# Patient Record
Sex: FEMALE | Race: WHITE | NOT HISPANIC OR LATINO | Employment: FULL TIME | URBAN - METROPOLITAN AREA
[De-identification: names, ages, dates, MRNs, and addresses within clinical notes are randomized per-mention and may not be internally consistent; named-entity substitution may affect disease eponyms.]

---

## 2023-04-14 LAB
HEPATITIS B SURFACE ANTIGEN (EXTERNAL): NEGATIVE
HIV1+2 AB SERPL QL IA: NONREACTIVE
RUBELLA ANTIBODY IGG (EXTERNAL): NORMAL
TREPONEMA PALLIDUM ANTIBODY (EXTERNAL): NONREACTIVE

## 2023-05-05 LAB — CHLAMYDIA - HIM PATIENT REPORTED: NEGATIVE

## 2023-06-02 ENCOUNTER — TRANSFERRED RECORDS (OUTPATIENT)
Dept: HEALTH INFORMATION MANAGEMENT | Facility: CLINIC | Age: 33
End: 2023-06-02

## 2023-07-24 ENCOUNTER — TRANSFERRED RECORDS (OUTPATIENT)
Dept: HEALTH INFORMATION MANAGEMENT | Facility: CLINIC | Age: 33
End: 2023-07-24
Payer: COMMERCIAL

## 2023-07-24 ENCOUNTER — MEDICAL CORRESPONDENCE (OUTPATIENT)
Dept: HEALTH INFORMATION MANAGEMENT | Facility: CLINIC | Age: 33
End: 2023-07-24
Payer: COMMERCIAL

## 2023-08-15 ENCOUNTER — OFFICE VISIT (OUTPATIENT)
Dept: CARDIOLOGY | Facility: CLINIC | Age: 33
End: 2023-08-15
Payer: COMMERCIAL

## 2023-08-15 ENCOUNTER — HOSPITAL ENCOUNTER (OUTPATIENT)
Dept: CARDIOLOGY | Facility: CLINIC | Age: 33
Discharge: HOME | End: 2023-08-15
Admitting: PHYSICIAN ASSISTANT
Payer: COMMERCIAL

## 2023-08-15 DIAGNOSIS — Z78.9 CONCEIVED BY IN VITRO FERTILIZATION: ICD-10-CM

## 2023-08-15 DIAGNOSIS — E66.01 OBESITY, CLASS III, BMI 40-49.9 (MORBID OBESITY) (H): ICD-10-CM

## 2023-08-15 DIAGNOSIS — O09.812 PREGNANCY RESULTING FROM IN VITRO FERTILIZATION IN SECOND TRIMESTER: Primary | ICD-10-CM

## 2023-08-15 PROCEDURE — 99204 OFFICE O/P NEW MOD 45 MIN: CPT | Mod: 25 | Performed by: PEDIATRICS

## 2023-08-15 PROCEDURE — 93325 DOPPLER ECHO COLOR FLOW MAPG: CPT | Mod: 26 | Performed by: PEDIATRICS

## 2023-08-15 PROCEDURE — 93325 DOPPLER ECHO COLOR FLOW MAPG: CPT

## 2023-08-15 PROCEDURE — 76825 ECHO EXAM OF FETAL HEART: CPT | Mod: 26 | Performed by: PEDIATRICS

## 2023-08-15 PROCEDURE — 76825 ECHO EXAM OF FETAL HEART: CPT

## 2023-08-15 PROCEDURE — 76827 ECHO EXAM OF FETAL HEART: CPT | Mod: 26 | Performed by: PEDIATRICS

## 2023-08-15 NOTE — PROGRESS NOTES
Fetal Cardiology Consultation    Patient:  Melissa L Scheierl MRN:  [id number]   YOB: 1990 Age:  33 year old   Date of Visit:  8/15/2023 PCP:  Katherin Samayoa PA-C   MIRNA: 12/13/23 EGA: 22+6 weeks     Dear Doctor:    I had the pleasure of seeing Melissa L Scheierl at the [facility] in [place] on 8/15/2023 in consultation for fetal echocardiography results. As you know, she is a 33 year old female with current pregnancy achieved through IVF.    The fetal echocardiogram was normal. Normal fetal cardiac anatomy. Normal right and left ventricular size and function without hypertrophy. No evidence of diastolic dysfunction. No pericardial effusion. No arrhythmia.     I reviewed and interpreted the fetal echocardiogram today. I discussed the normal results with Ms. Scheierl. While these results are normal, it is important to note that fetal echocardiography cannot exclude small atrial or ventricular septal defects, persistent ductus arteriosus, mild coarctation of the aorta, partial anomalous pulmonary venous return, minor anatomic valve anomalies, or coronary artery anomalies.     Thank you for allowing me to participate in Ms. Scheierl's care. Please don't hesitate to contact me or the Fetal Cardiology team at [facility] with any questions or concerns.     I spent a total of 40 minutes on the date of the encounter doing chart review, patient history, documentation, counseling, and coordinating care.    Rinku Maria MD  Pediatric Cardiology  [facility]  Phone [phone]    Review of the result(s) of each unique test - fetal echocardiogram

## 2023-11-14 ENCOUNTER — HOSPITAL ENCOUNTER (OUTPATIENT)
Facility: CLINIC | Age: 33
Discharge: HOME | End: 2023-11-14
Attending: OBSTETRICS & GYNECOLOGY | Admitting: OBSTETRICS & GYNECOLOGY
Payer: COMMERCIAL

## 2023-11-14 VITALS
SYSTOLIC BLOOD PRESSURE: 111 MMHG | BODY MASS INDEX: 41.95 KG/M2 | HEART RATE: 105 BPM | WEIGHT: 293 LBS | DIASTOLIC BLOOD PRESSURE: 59 MMHG | RESPIRATION RATE: 16 BRPM | HEIGHT: 70 IN | TEMPERATURE: 97.9 F

## 2023-11-14 PROBLEM — Z36.89 ENCOUNTER FOR TRIAGE IN PREGNANT PATIENT: Status: ACTIVE | Noted: 2023-11-14

## 2023-11-14 PROCEDURE — G0463 HOSPITAL OUTPT CLINIC VISIT: HCPCS | Mod: 25

## 2023-11-14 RX ORDER — PROCHLORPERAZINE 25 MG
25 SUPPOSITORY, RECTAL RECTAL EVERY 12 HOURS PRN
Status: DISCONTINUED | OUTPATIENT
Start: 2023-11-14 | End: 2023-11-14 | Stop reason: HOSPADM

## 2023-11-14 RX ORDER — VITAMIN A ACETATE, .BETA.-CAROTENE, ASCORBIC ACID, CHOLECALCIFEROL, .ALPHA.-TOCOPHEROL ACETATE, DL-, THIAMINE MONONITRATE, RIBOFLAVIN, NIACINAMIDE, PYRIDOXINE HYDROCHLORIDE, FOLIC ACID, CYANOCOBALAMIN, CALCIUM CARBONATE, FERROUS FUMARATE, ZINC OXIDE, AND CUPRIC OXIDE 2000; 2000; 120; 400; 22; 1.84; 3; 20; 10; 1; 12; 200; 27; 25; 2 [IU]/1; [IU]/1; MG/1; [IU]/1; MG/1; MG/1; MG/1; MG/1; MG/1; MG/1; UG/1; MG/1; MG/1; MG/1; MG/1
1 TABLET ORAL DAILY
COMMUNITY

## 2023-11-14 RX ORDER — LABETALOL 200 MG/1
200 TABLET, FILM COATED ORAL 2 TIMES DAILY
Status: ON HOLD | COMMUNITY
End: 2023-11-25

## 2023-11-14 RX ORDER — METOCLOPRAMIDE HYDROCHLORIDE 5 MG/ML
10 INJECTION INTRAMUSCULAR; INTRAVENOUS EVERY 6 HOURS PRN
Status: DISCONTINUED | OUTPATIENT
Start: 2023-11-14 | End: 2023-11-14 | Stop reason: HOSPADM

## 2023-11-14 RX ORDER — METOCLOPRAMIDE 10 MG/1
10 TABLET ORAL EVERY 6 HOURS PRN
Status: DISCONTINUED | OUTPATIENT
Start: 2023-11-14 | End: 2023-11-14 | Stop reason: HOSPADM

## 2023-11-14 RX ORDER — PROCHLORPERAZINE MALEATE 5 MG
10 TABLET ORAL EVERY 6 HOURS PRN
Status: DISCONTINUED | OUTPATIENT
Start: 2023-11-14 | End: 2023-11-14 | Stop reason: HOSPADM

## 2023-11-14 RX ORDER — ONDANSETRON 4 MG/1
4 TABLET, ORALLY DISINTEGRATING ORAL EVERY 6 HOURS PRN
Status: DISCONTINUED | OUTPATIENT
Start: 2023-11-14 | End: 2023-11-14 | Stop reason: HOSPADM

## 2023-11-14 RX ORDER — ASPIRIN 81 MG/1
81 TABLET, CHEWABLE ORAL DAILY
COMMUNITY

## 2023-11-14 RX ORDER — POLYETHYLENE GLYCOL 3350 17 G/17G
1 POWDER, FOR SOLUTION ORAL DAILY
COMMUNITY

## 2023-11-14 RX ORDER — ONDANSETRON 2 MG/ML
4 INJECTION INTRAMUSCULAR; INTRAVENOUS EVERY 6 HOURS PRN
Status: DISCONTINUED | OUTPATIENT
Start: 2023-11-14 | End: 2023-11-14 | Stop reason: HOSPADM

## 2023-11-14 ASSESSMENT — ACTIVITIES OF DAILY LIVING (ADL): ADLS_ACUITY_SCORE: 18

## 2023-11-15 ENCOUNTER — TRANSFERRED RECORDS (OUTPATIENT)
Dept: HEALTH INFORMATION MANAGEMENT | Facility: CLINIC | Age: 33
End: 2023-11-15
Payer: COMMERCIAL

## 2023-11-15 LAB — GROUP B STREPTOCOCCUS (EXTERNAL): NEGATIVE

## 2023-11-15 NOTE — PROVIDER NOTIFICATION
11/14/23 2122   Provider Notification   Provider Name/Title Dr. Chikis amaya   Method of Notification Phone   Request Evaluate - Remote   Notification Reason Patient Arrived     Spoke with Dr. Chiksi Amaya and updated her on but not limited to fetal heart rate, blood pressure, patient status. Orders obtained to discharge home and follow up in clinic tomorrow. TORB. Patient agrees with plan of care.

## 2023-11-15 NOTE — PROGRESS NOTES
Data: Patient assessed in the [facility] for decreased fetal movement and hypertension disorder of pregnancy. Cervical exam deferred. Membranes intact. Contractions are not present. See flowsheets for fetal assessment documentation.     Action: Presumed adequate fetal oxygenation documented. Discharge instructions reviewed. Patient instructed to report change in fetal movement, vaginal leaking of fluid or bleeding, abdominal pain, or any concerns related to the pregnancy to provider/clinic.      Response: Orders to discharge home per Dr. Chikis Coon. Patient verbalized understanding of education and agreement with plan. Discharged to home at 2136.

## 2023-11-15 NOTE — PROGRESS NOTES
Discharge instructions given to patient. Denies concerns or questions at this time. Signs and symptoms of preeclampsia and labor reviewed with patient. Patient and significant other ambulated off unit together.

## 2023-11-20 LAB
ABO/RH(D): NORMAL
ANTIBODY SCREEN: NEGATIVE
SPECIMEN EXPIRATION DATE: NORMAL

## 2023-11-21 ENCOUNTER — LAB (OUTPATIENT)
Dept: LAB | Facility: CLINIC | Age: 33
End: 2023-11-21
Payer: COMMERCIAL

## 2023-11-21 DIAGNOSIS — Z01.818 PREOPERATIVE EXAMINATION: ICD-10-CM

## 2023-11-21 LAB
ERYTHROCYTE [DISTWIDTH] IN BLOOD BY AUTOMATED COUNT: 13.8 % (ref 10–15)
HCT VFR BLD AUTO: 41.5 % (ref 35–47)
HGB BLD-MCNC: 13.5 G/DL (ref 11.7–15.7)
MCH RBC QN AUTO: 28.7 PG (ref 26.5–33)
MCHC RBC AUTO-ENTMCNC: 32.5 G/DL (ref 31.5–36.5)
MCV RBC AUTO: 88 FL (ref 78–100)
PLATELET # BLD AUTO: 220 10E3/UL (ref 150–450)
RBC # BLD AUTO: 4.7 10E6/UL (ref 3.8–5.2)
WBC # BLD AUTO: 11.6 10E3/UL (ref 4–11)

## 2023-11-21 PROCEDURE — 86780 TREPONEMA PALLIDUM: CPT

## 2023-11-21 PROCEDURE — 85027 COMPLETE CBC AUTOMATED: CPT

## 2023-11-21 PROCEDURE — 36415 COLL VENOUS BLD VENIPUNCTURE: CPT

## 2023-11-21 PROCEDURE — 86900 BLOOD TYPING SEROLOGIC ABO: CPT

## 2023-11-22 ENCOUNTER — HOSPITAL ENCOUNTER (INPATIENT)
Facility: CLINIC | Age: 33
LOS: 3 days | Discharge: HOME HEALTH | End: 2023-11-25
Attending: STUDENT IN AN ORGANIZED HEALTH CARE EDUCATION/TRAINING PROGRAM | Admitting: STUDENT IN AN ORGANIZED HEALTH CARE EDUCATION/TRAINING PROGRAM
Payer: COMMERCIAL

## 2023-11-22 ENCOUNTER — ANESTHESIA (OUTPATIENT)
Dept: OBGYN | Facility: CLINIC | Age: 33
End: 2023-11-22
Payer: COMMERCIAL

## 2023-11-22 ENCOUNTER — ANESTHESIA EVENT (OUTPATIENT)
Dept: OBGYN | Facility: CLINIC | Age: 33
End: 2023-11-22
Payer: COMMERCIAL

## 2023-11-22 DIAGNOSIS — O10.919 CHRONIC HYPERTENSION AFFECTING PREGNANCY: ICD-10-CM

## 2023-11-22 DIAGNOSIS — R21 RASH: ICD-10-CM

## 2023-11-22 DIAGNOSIS — Z98.891 S/P CESAREAN SECTION: Primary | ICD-10-CM

## 2023-11-22 LAB — T PALLIDUM AB SER QL: NONREACTIVE

## 2023-11-22 PROCEDURE — 370N000017 HC ANESTHESIA TECHNICAL FEE, PER MIN: Performed by: STUDENT IN AN ORGANIZED HEALTH CARE EDUCATION/TRAINING PROGRAM

## 2023-11-22 PROCEDURE — 88307 TISSUE EXAM BY PATHOLOGIST: CPT | Mod: TC | Performed by: STUDENT IN AN ORGANIZED HEALTH CARE EDUCATION/TRAINING PROGRAM

## 2023-11-22 PROCEDURE — 360N000076 HC SURGERY LEVEL 3, PER MIN: Performed by: STUDENT IN AN ORGANIZED HEALTH CARE EDUCATION/TRAINING PROGRAM

## 2023-11-22 PROCEDURE — 250N000025 HC SEVOFLURANE, PER MIN: Performed by: STUDENT IN AN ORGANIZED HEALTH CARE EDUCATION/TRAINING PROGRAM

## 2023-11-22 PROCEDURE — 710N000011 HC RECOVERY PHASE 1, LEVEL 3, PER MIN: Performed by: STUDENT IN AN ORGANIZED HEALTH CARE EDUCATION/TRAINING PROGRAM

## 2023-11-22 PROCEDURE — 250N000011 HC RX IP 250 OP 636: Mod: JZ | Performed by: NURSE ANESTHETIST, CERTIFIED REGISTERED

## 2023-11-22 PROCEDURE — 250N000011 HC RX IP 250 OP 636: Performed by: STUDENT IN AN ORGANIZED HEALTH CARE EDUCATION/TRAINING PROGRAM

## 2023-11-22 PROCEDURE — 250N000013 HC RX MED GY IP 250 OP 250 PS 637

## 2023-11-22 PROCEDURE — 120N000012 HC R&B POSTPARTUM

## 2023-11-22 PROCEDURE — 250N000011 HC RX IP 250 OP 636: Mod: JZ | Performed by: STUDENT IN AN ORGANIZED HEALTH CARE EDUCATION/TRAINING PROGRAM

## 2023-11-22 PROCEDURE — 250N000013 HC RX MED GY IP 250 OP 250 PS 637: Performed by: STUDENT IN AN ORGANIZED HEALTH CARE EDUCATION/TRAINING PROGRAM

## 2023-11-22 PROCEDURE — 258N000003 HC RX IP 258 OP 636: Performed by: NURSE ANESTHETIST, CERTIFIED REGISTERED

## 2023-11-22 PROCEDURE — 250N000009 HC RX 250: Performed by: STUDENT IN AN ORGANIZED HEALTH CARE EDUCATION/TRAINING PROGRAM

## 2023-11-22 PROCEDURE — 250N000011 HC RX IP 250 OP 636: Performed by: SURGERY

## 2023-11-22 PROCEDURE — 88307 TISSUE EXAM BY PATHOLOGIST: CPT | Mod: 26 | Performed by: STUDENT IN AN ORGANIZED HEALTH CARE EDUCATION/TRAINING PROGRAM

## 2023-11-22 PROCEDURE — 250N000009 HC RX 250: Performed by: NURSE ANESTHETIST, CERTIFIED REGISTERED

## 2023-11-22 PROCEDURE — 258N000003 HC RX IP 258 OP 636: Performed by: STUDENT IN AN ORGANIZED HEALTH CARE EDUCATION/TRAINING PROGRAM

## 2023-11-22 PROCEDURE — 272N000001 HC OR GENERAL SUPPLY STERILE: Performed by: STUDENT IN AN ORGANIZED HEALTH CARE EDUCATION/TRAINING PROGRAM

## 2023-11-22 RX ORDER — NALOXONE HYDROCHLORIDE 0.4 MG/ML
0.2 INJECTION, SOLUTION INTRAMUSCULAR; INTRAVENOUS; SUBCUTANEOUS
Status: DISCONTINUED | OUTPATIENT
Start: 2023-11-22 | End: 2023-11-25 | Stop reason: HOSPADM

## 2023-11-22 RX ORDER — NALOXONE HYDROCHLORIDE 0.4 MG/ML
0.4 INJECTION, SOLUTION INTRAMUSCULAR; INTRAVENOUS; SUBCUTANEOUS
Status: DISCONTINUED | OUTPATIENT
Start: 2023-11-22 | End: 2023-11-25 | Stop reason: HOSPADM

## 2023-11-22 RX ORDER — CITRIC ACID/SODIUM CITRATE 334-500MG
SOLUTION, ORAL ORAL
Status: COMPLETED
Start: 2023-11-22 | End: 2023-11-22

## 2023-11-22 RX ORDER — LABETALOL HYDROCHLORIDE 5 MG/ML
10 INJECTION, SOLUTION INTRAVENOUS ONCE
Status: COMPLETED | OUTPATIENT
Start: 2023-11-22 | End: 2023-11-22

## 2023-11-22 RX ORDER — MISOPROSTOL 200 UG/1
800 TABLET ORAL
Status: DISCONTINUED | OUTPATIENT
Start: 2023-11-22 | End: 2023-11-22

## 2023-11-22 RX ORDER — CEFAZOLIN SODIUM/WATER 3 G/30 ML
3 SYRINGE (ML) INTRAVENOUS
Status: COMPLETED | OUTPATIENT
Start: 2023-11-22 | End: 2023-11-22

## 2023-11-22 RX ORDER — CARBOPROST TROMETHAMINE 250 UG/ML
250 INJECTION, SOLUTION INTRAMUSCULAR
Status: DISCONTINUED | OUTPATIENT
Start: 2023-11-22 | End: 2023-11-25 | Stop reason: HOSPADM

## 2023-11-22 RX ORDER — MAGNESIUM SULFATE IN WATER 40 MG/ML
2 INJECTION, SOLUTION INTRAVENOUS CONTINUOUS
Status: DISCONTINUED | OUTPATIENT
Start: 2023-11-22 | End: 2023-11-25 | Stop reason: HOSPADM

## 2023-11-22 RX ORDER — TRANEXAMIC ACID 10 MG/ML
1 INJECTION, SOLUTION INTRAVENOUS EVERY 30 MIN PRN
Status: DISCONTINUED | OUTPATIENT
Start: 2023-11-22 | End: 2023-11-25 | Stop reason: HOSPADM

## 2023-11-22 RX ORDER — MAGNESIUM SULFATE HEPTAHYDRATE 40 MG/ML
4 INJECTION, SOLUTION INTRAVENOUS ONCE
Status: COMPLETED | OUTPATIENT
Start: 2023-11-22 | End: 2023-11-22

## 2023-11-22 RX ORDER — LIDOCAINE 40 MG/G
CREAM TOPICAL
Status: DISCONTINUED | OUTPATIENT
Start: 2023-11-22 | End: 2023-11-22

## 2023-11-22 RX ORDER — CEFAZOLIN SODIUM/WATER 3 G/30 ML
3 SYRINGE (ML) INTRAVENOUS SEE ADMIN INSTRUCTIONS
Status: DISCONTINUED | OUTPATIENT
Start: 2023-11-22 | End: 2023-11-22

## 2023-11-22 RX ORDER — ACETAMINOPHEN 325 MG/1
TABLET ORAL
Status: COMPLETED
Start: 2023-11-22 | End: 2023-11-22

## 2023-11-22 RX ORDER — OXYTOCIN/0.9 % SODIUM CHLORIDE 30/500 ML
340 PLASTIC BAG, INJECTION (ML) INTRAVENOUS CONTINUOUS PRN
Status: DISCONTINUED | OUTPATIENT
Start: 2023-11-22 | End: 2023-11-25 | Stop reason: HOSPADM

## 2023-11-22 RX ORDER — PROCHLORPERAZINE 25 MG
25 SUPPOSITORY, RECTAL RECTAL EVERY 12 HOURS PRN
Status: DISCONTINUED | OUTPATIENT
Start: 2023-11-22 | End: 2023-11-25 | Stop reason: HOSPADM

## 2023-11-22 RX ORDER — DEXTROSE, SODIUM CHLORIDE, SODIUM LACTATE, POTASSIUM CHLORIDE, AND CALCIUM CHLORIDE 5; .6; .31; .03; .02 G/100ML; G/100ML; G/100ML; G/100ML; G/100ML
INJECTION, SOLUTION INTRAVENOUS CONTINUOUS
Status: DISCONTINUED | OUTPATIENT
Start: 2023-11-22 | End: 2023-11-25 | Stop reason: HOSPADM

## 2023-11-22 RX ORDER — METHYLERGONOVINE MALEATE 0.2 MG/ML
200 INJECTION INTRAVENOUS
Status: DISCONTINUED | OUTPATIENT
Start: 2023-11-22 | End: 2023-11-25 | Stop reason: HOSPADM

## 2023-11-22 RX ORDER — LIDOCAINE 40 MG/G
CREAM TOPICAL
Status: DISCONTINUED | OUTPATIENT
Start: 2023-11-22 | End: 2023-11-25 | Stop reason: HOSPADM

## 2023-11-22 RX ORDER — ONDANSETRON 4 MG/1
4 TABLET, ORALLY DISINTEGRATING ORAL EVERY 6 HOURS PRN
Status: DISCONTINUED | OUTPATIENT
Start: 2023-11-22 | End: 2023-11-25 | Stop reason: HOSPADM

## 2023-11-22 RX ORDER — ACETAMINOPHEN 325 MG/1
975 TABLET ORAL EVERY 6 HOURS
Status: DISCONTINUED | OUTPATIENT
Start: 2023-11-22 | End: 2023-11-25 | Stop reason: HOSPADM

## 2023-11-22 RX ORDER — ACETAMINOPHEN 325 MG/1
975 TABLET ORAL ONCE
Status: COMPLETED | OUTPATIENT
Start: 2023-11-22 | End: 2023-11-22

## 2023-11-22 RX ORDER — OXYTOCIN/0.9 % SODIUM CHLORIDE 30/500 ML
340 PLASTIC BAG, INJECTION (ML) INTRAVENOUS CONTINUOUS PRN
Status: DISCONTINUED | OUTPATIENT
Start: 2023-11-22 | End: 2023-11-22

## 2023-11-22 RX ORDER — PROPOFOL 10 MG/ML
INJECTION, EMULSION INTRAVENOUS PRN
Status: DISCONTINUED | OUTPATIENT
Start: 2023-11-22 | End: 2023-11-22

## 2023-11-22 RX ORDER — AMOXICILLIN 250 MG
2 CAPSULE ORAL 2 TIMES DAILY
Status: DISCONTINUED | OUTPATIENT
Start: 2023-11-22 | End: 2023-11-25 | Stop reason: HOSPADM

## 2023-11-22 RX ORDER — MODIFIED LANOLIN
OINTMENT (GRAM) TOPICAL
Status: DISCONTINUED | OUTPATIENT
Start: 2023-11-22 | End: 2023-11-25 | Stop reason: HOSPADM

## 2023-11-22 RX ORDER — FENTANYL CITRATE 50 UG/ML
INJECTION, SOLUTION INTRAMUSCULAR; INTRAVENOUS PRN
Status: DISCONTINUED | OUTPATIENT
Start: 2023-11-22 | End: 2023-11-22

## 2023-11-22 RX ORDER — OXYTOCIN 10 [USP'U]/ML
10 INJECTION, SOLUTION INTRAMUSCULAR; INTRAVENOUS
Status: DISCONTINUED | OUTPATIENT
Start: 2023-11-22 | End: 2023-11-22

## 2023-11-22 RX ORDER — BISACODYL 10 MG
10 SUPPOSITORY, RECTAL RECTAL DAILY PRN
Status: DISCONTINUED | OUTPATIENT
Start: 2023-11-24 | End: 2023-11-25 | Stop reason: HOSPADM

## 2023-11-22 RX ORDER — MISOPROSTOL 200 UG/1
400 TABLET ORAL
Status: DISCONTINUED | OUTPATIENT
Start: 2023-11-22 | End: 2023-11-25 | Stop reason: HOSPADM

## 2023-11-22 RX ORDER — MAGNESIUM SULFATE HEPTAHYDRATE 40 MG/ML
2 INJECTION, SOLUTION INTRAVENOUS
Status: DISCONTINUED | OUTPATIENT
Start: 2023-11-22 | End: 2023-11-25 | Stop reason: HOSPADM

## 2023-11-22 RX ORDER — LABETALOL HYDROCHLORIDE 5 MG/ML
20 INJECTION, SOLUTION INTRAVENOUS ONCE
Status: COMPLETED | OUTPATIENT
Start: 2023-11-22 | End: 2023-11-22

## 2023-11-22 RX ORDER — SODIUM CHLORIDE, SODIUM LACTATE, POTASSIUM CHLORIDE, CALCIUM CHLORIDE 600; 310; 30; 20 MG/100ML; MG/100ML; MG/100ML; MG/100ML
INJECTION, SOLUTION INTRAVENOUS CONTINUOUS
Status: DISCONTINUED | OUTPATIENT
Start: 2023-11-22 | End: 2023-11-22

## 2023-11-22 RX ORDER — OXYCODONE HYDROCHLORIDE 5 MG/1
5 TABLET ORAL EVERY 4 HOURS PRN
Status: DISCONTINUED | OUTPATIENT
Start: 2023-11-22 | End: 2023-11-25 | Stop reason: HOSPADM

## 2023-11-22 RX ORDER — SODIUM CHLORIDE, SODIUM LACTATE, POTASSIUM CHLORIDE, CALCIUM CHLORIDE 600; 310; 30; 20 MG/100ML; MG/100ML; MG/100ML; MG/100ML
10-125 INJECTION, SOLUTION INTRAVENOUS CONTINUOUS
Status: DISCONTINUED | OUTPATIENT
Start: 2023-11-22 | End: 2023-11-25 | Stop reason: HOSPADM

## 2023-11-22 RX ORDER — SIMETHICONE 80 MG
80 TABLET,CHEWABLE ORAL 4 TIMES DAILY PRN
Status: DISCONTINUED | OUTPATIENT
Start: 2023-11-22 | End: 2023-11-25 | Stop reason: HOSPADM

## 2023-11-22 RX ORDER — FENTANYL CITRATE-0.9 % NACL/PF 10 MCG/ML
100 PLASTIC BAG, INJECTION (ML) INTRAVENOUS EVERY 5 MIN PRN
Status: DISCONTINUED | OUTPATIENT
Start: 2023-11-22 | End: 2023-11-22 | Stop reason: HOSPADM

## 2023-11-22 RX ORDER — HYDROCORTISONE 25 MG/G
CREAM TOPICAL 3 TIMES DAILY PRN
Status: DISCONTINUED | OUTPATIENT
Start: 2023-11-22 | End: 2023-11-25 | Stop reason: HOSPADM

## 2023-11-22 RX ORDER — MISOPROSTOL 200 UG/1
800 TABLET ORAL
Status: DISCONTINUED | OUTPATIENT
Start: 2023-11-22 | End: 2023-11-25 | Stop reason: HOSPADM

## 2023-11-22 RX ORDER — METOCLOPRAMIDE HYDROCHLORIDE 5 MG/ML
10 INJECTION INTRAMUSCULAR; INTRAVENOUS EVERY 6 HOURS PRN
Status: DISCONTINUED | OUTPATIENT
Start: 2023-11-22 | End: 2023-11-25 | Stop reason: HOSPADM

## 2023-11-22 RX ORDER — HYDROMORPHONE HYDROCHLORIDE 1 MG/ML
0.5 INJECTION, SOLUTION INTRAMUSCULAR; INTRAVENOUS; SUBCUTANEOUS
Status: DISCONTINUED | OUTPATIENT
Start: 2023-11-22 | End: 2023-11-25 | Stop reason: HOSPADM

## 2023-11-22 RX ORDER — HYDRALAZINE HYDROCHLORIDE 20 MG/ML
10 INJECTION INTRAMUSCULAR; INTRAVENOUS
Status: DISCONTINUED | OUTPATIENT
Start: 2023-11-22 | End: 2023-11-25 | Stop reason: HOSPADM

## 2023-11-22 RX ORDER — FENTANYL CITRATE 50 UG/ML
50 INJECTION, SOLUTION INTRAMUSCULAR; INTRAVENOUS ONCE
Status: COMPLETED | OUTPATIENT
Start: 2023-11-22 | End: 2023-11-22

## 2023-11-22 RX ORDER — MAGNESIUM SULFATE HEPTAHYDRATE 40 MG/ML
4 INJECTION, SOLUTION INTRAVENOUS
Status: DISCONTINUED | OUTPATIENT
Start: 2023-11-22 | End: 2023-11-25 | Stop reason: HOSPADM

## 2023-11-22 RX ORDER — IBUPROFEN 400 MG/1
800 TABLET, FILM COATED ORAL EVERY 6 HOURS
Status: DISCONTINUED | OUTPATIENT
Start: 2023-11-23 | End: 2023-11-25 | Stop reason: HOSPADM

## 2023-11-22 RX ORDER — CALCIUM GLUCONATE 94 MG/ML
1 INJECTION, SOLUTION INTRAVENOUS
Status: DISCONTINUED | OUTPATIENT
Start: 2023-11-22 | End: 2023-11-25 | Stop reason: HOSPADM

## 2023-11-22 RX ORDER — PROCHLORPERAZINE MALEATE 10 MG
10 TABLET ORAL EVERY 6 HOURS PRN
Status: DISCONTINUED | OUTPATIENT
Start: 2023-11-22 | End: 2023-11-25 | Stop reason: HOSPADM

## 2023-11-22 RX ORDER — KETOROLAC TROMETHAMINE 30 MG/ML
30 INJECTION, SOLUTION INTRAMUSCULAR; INTRAVENOUS EVERY 6 HOURS
Qty: 3 ML | Refills: 0 | Status: COMPLETED | OUTPATIENT
Start: 2023-11-22 | End: 2023-11-22

## 2023-11-22 RX ORDER — HYDROMORPHONE HYDROCHLORIDE 1 MG/ML
INJECTION, SOLUTION INTRAMUSCULAR; INTRAVENOUS; SUBCUTANEOUS PRN
Status: DISCONTINUED | OUTPATIENT
Start: 2023-11-22 | End: 2023-11-22

## 2023-11-22 RX ORDER — CEFAZOLIN SODIUM/WATER 3 G/30 ML
SYRINGE (ML) INTRAVENOUS
Status: DISCONTINUED
Start: 2023-11-22 | End: 2023-11-22 | Stop reason: HOSPADM

## 2023-11-22 RX ORDER — METHYLERGONOVINE MALEATE 0.2 MG/ML
200 INJECTION INTRAVENOUS
Status: DISCONTINUED | OUTPATIENT
Start: 2023-11-22 | End: 2023-11-22

## 2023-11-22 RX ORDER — ENOXAPARIN SODIUM 100 MG/ML
40 INJECTION SUBCUTANEOUS EVERY 12 HOURS
Status: DISCONTINUED | OUTPATIENT
Start: 2023-11-22 | End: 2023-11-25 | Stop reason: HOSPADM

## 2023-11-22 RX ORDER — CITRIC ACID/SODIUM CITRATE 334-500MG
30 SOLUTION, ORAL ORAL
Status: COMPLETED | OUTPATIENT
Start: 2023-11-22 | End: 2023-11-22

## 2023-11-22 RX ORDER — ONDANSETRON 2 MG/ML
4 INJECTION INTRAMUSCULAR; INTRAVENOUS EVERY 6 HOURS PRN
Status: DISCONTINUED | OUTPATIENT
Start: 2023-11-22 | End: 2023-11-25 | Stop reason: HOSPADM

## 2023-11-22 RX ORDER — METOCLOPRAMIDE 10 MG/1
10 TABLET ORAL EVERY 6 HOURS PRN
Status: DISCONTINUED | OUTPATIENT
Start: 2023-11-22 | End: 2023-11-25 | Stop reason: HOSPADM

## 2023-11-22 RX ORDER — SODIUM CHLORIDE, SODIUM LACTATE, POTASSIUM CHLORIDE, CALCIUM CHLORIDE 600; 310; 30; 20 MG/100ML; MG/100ML; MG/100ML; MG/100ML
INJECTION, SOLUTION INTRAVENOUS CONTINUOUS PRN
Status: DISCONTINUED | OUTPATIENT
Start: 2023-11-22 | End: 2023-11-22

## 2023-11-22 RX ORDER — MISOPROSTOL 200 UG/1
400 TABLET ORAL
Status: DISCONTINUED | OUTPATIENT
Start: 2023-11-22 | End: 2023-11-22

## 2023-11-22 RX ORDER — OXYTOCIN 10 [USP'U]/ML
10 INJECTION, SOLUTION INTRAMUSCULAR; INTRAVENOUS
Status: DISCONTINUED | OUTPATIENT
Start: 2023-11-22 | End: 2023-11-25 | Stop reason: HOSPADM

## 2023-11-22 RX ORDER — ONDANSETRON 2 MG/ML
INJECTION INTRAMUSCULAR; INTRAVENOUS PRN
Status: DISCONTINUED | OUTPATIENT
Start: 2023-11-22 | End: 2023-11-22

## 2023-11-22 RX ORDER — LABETALOL 200 MG/1
200 TABLET, FILM COATED ORAL 2 TIMES DAILY
Status: DISCONTINUED | OUTPATIENT
Start: 2023-11-22 | End: 2023-11-23

## 2023-11-22 RX ORDER — LABETALOL HYDROCHLORIDE 5 MG/ML
20-80 INJECTION, SOLUTION INTRAVENOUS EVERY 10 MIN PRN
Status: DISCONTINUED | OUTPATIENT
Start: 2023-11-22 | End: 2023-11-25 | Stop reason: HOSPADM

## 2023-11-22 RX ORDER — AMOXICILLIN 250 MG
1 CAPSULE ORAL 2 TIMES DAILY
Status: DISCONTINUED | OUTPATIENT
Start: 2023-11-22 | End: 2023-11-25 | Stop reason: HOSPADM

## 2023-11-22 RX ORDER — OXYTOCIN/0.9 % SODIUM CHLORIDE 30/500 ML
100-340 PLASTIC BAG, INJECTION (ML) INTRAVENOUS CONTINUOUS PRN
Status: DISCONTINUED | OUTPATIENT
Start: 2023-11-22 | End: 2023-11-22

## 2023-11-22 RX ORDER — NALBUPHINE HYDROCHLORIDE 20 MG/ML
2.5-5 INJECTION, SOLUTION INTRAMUSCULAR; INTRAVENOUS; SUBCUTANEOUS EVERY 6 HOURS PRN
Status: DISCONTINUED | OUTPATIENT
Start: 2023-11-22 | End: 2023-11-23

## 2023-11-22 RX ORDER — TRANEXAMIC ACID 10 MG/ML
1 INJECTION, SOLUTION INTRAVENOUS EVERY 30 MIN PRN
Status: DISCONTINUED | OUTPATIENT
Start: 2023-11-22 | End: 2023-11-22

## 2023-11-22 RX ORDER — CARBOPROST TROMETHAMINE 250 UG/ML
250 INJECTION, SOLUTION INTRAMUSCULAR
Status: DISCONTINUED | OUTPATIENT
Start: 2023-11-22 | End: 2023-11-22

## 2023-11-22 RX ADMIN — SODIUM CHLORIDE, POTASSIUM CHLORIDE, SODIUM LACTATE AND CALCIUM CHLORIDE: 600; 310; 30; 20 INJECTION, SOLUTION INTRAVENOUS at 07:20

## 2023-11-22 RX ADMIN — Medication 3 G: at 07:35

## 2023-11-22 RX ADMIN — FENTANYL CITRATE 50 MCG: 50 INJECTION, SOLUTION INTRAMUSCULAR; INTRAVENOUS at 09:23

## 2023-11-22 RX ADMIN — LABETALOL HYDROCHLORIDE 10 MG: 5 INJECTION INTRAVENOUS at 09:16

## 2023-11-22 RX ADMIN — LABETALOL HYDROCHLORIDE 200 MG: 200 TABLET, FILM COATED ORAL at 21:37

## 2023-11-22 RX ADMIN — LABETALOL HYDROCHLORIDE 80 MG: 5 INJECTION INTRAVENOUS at 10:07

## 2023-11-22 RX ADMIN — SODIUM CHLORIDE, POTASSIUM CHLORIDE, SODIUM LACTATE AND CALCIUM CHLORIDE: 600; 310; 30; 20 INJECTION, SOLUTION INTRAVENOUS at 06:49

## 2023-11-22 RX ADMIN — Medication 30 ML: at 07:11

## 2023-11-22 RX ADMIN — PROPOFOL 300 MG: 10 INJECTION, EMULSION INTRAVENOUS at 07:44

## 2023-11-22 RX ADMIN — TRANEXAMIC ACID 1 G: 1 INJECTION, SOLUTION INTRAVENOUS at 07:54

## 2023-11-22 RX ADMIN — FENTANYL CITRATE 50 MCG: 50 INJECTION INTRAMUSCULAR; INTRAVENOUS at 08:54

## 2023-11-22 RX ADMIN — HYDROMORPHONE HYDROCHLORIDE 0.5 MG: 1 INJECTION, SOLUTION INTRAMUSCULAR; INTRAVENOUS; SUBCUTANEOUS at 07:58

## 2023-11-22 RX ADMIN — HYDROMORPHONE HYDROCHLORIDE 0.5 MG: 1 INJECTION, SOLUTION INTRAMUSCULAR; INTRAVENOUS; SUBCUTANEOUS at 11:28

## 2023-11-22 RX ADMIN — KETOROLAC TROMETHAMINE 30 MG: 30 INJECTION, SOLUTION INTRAMUSCULAR; INTRAVENOUS at 11:10

## 2023-11-22 RX ADMIN — ONDANSETRON 4 MG: 2 INJECTION INTRAMUSCULAR; INTRAVENOUS at 07:23

## 2023-11-22 RX ADMIN — KETOROLAC TROMETHAMINE 30 MG: 30 INJECTION, SOLUTION INTRAMUSCULAR; INTRAVENOUS at 22:25

## 2023-11-22 RX ADMIN — DEXMEDETOMIDINE HYDROCHLORIDE 16 MCG: 100 INJECTION, SOLUTION INTRAVENOUS at 08:00

## 2023-11-22 RX ADMIN — FENTANYL CITRATE 50 MCG: 50 INJECTION INTRAMUSCULAR; INTRAVENOUS at 08:57

## 2023-11-22 RX ADMIN — ACETAMINOPHEN 975 MG: 325 TABLET, FILM COATED ORAL at 06:56

## 2023-11-22 RX ADMIN — LABETALOL HYDROCHLORIDE 20 MG: 5 INJECTION INTRAVENOUS at 09:49

## 2023-11-22 RX ADMIN — SUGAMMADEX 400 MG: 100 INJECTION, SOLUTION INTRAVENOUS at 08:43

## 2023-11-22 RX ADMIN — ROCURONIUM BROMIDE 50 MG: 50 INJECTION, SOLUTION INTRAVENOUS at 08:04

## 2023-11-22 RX ADMIN — SODIUM CHLORIDE, POTASSIUM CHLORIDE, SODIUM LACTATE AND CALCIUM CHLORIDE: 600; 310; 30; 20 INJECTION, SOLUTION INTRAVENOUS at 06:15

## 2023-11-22 RX ADMIN — ACETAMINOPHEN 975 MG: 325 TABLET, FILM COATED ORAL at 14:18

## 2023-11-22 RX ADMIN — FENTANYL CITRATE 100 MCG: 50 INJECTION INTRAMUSCULAR; INTRAVENOUS at 07:52

## 2023-11-22 RX ADMIN — Medication 340 ML/HR: at 07:54

## 2023-11-22 RX ADMIN — MAGNESIUM SULFATE HEPTAHYDRATE 2 G/HR: 40 INJECTION, SOLUTION INTRAVENOUS at 20:37

## 2023-11-22 RX ADMIN — SODIUM CITRATE AND CITRIC ACID MONOHYDRATE 30 ML: 500; 334 SOLUTION ORAL at 07:11

## 2023-11-22 RX ADMIN — LABETALOL HYDROCHLORIDE 10 MG: 5 INJECTION INTRAVENOUS at 09:34

## 2023-11-22 RX ADMIN — SODIUM CHLORIDE, POTASSIUM CHLORIDE, SODIUM LACTATE AND CALCIUM CHLORIDE 75 ML/HR: 600; 310; 30; 20 INJECTION, SOLUTION INTRAVENOUS at 12:02

## 2023-11-22 RX ADMIN — DOCUSATE SODIUM 50 MG AND SENNOSIDES 8.6 MG 1 TABLET: 8.6; 5 TABLET, FILM COATED ORAL at 20:24

## 2023-11-22 RX ADMIN — OXYCODONE HYDROCHLORIDE 5 MG: 5 TABLET ORAL at 16:30

## 2023-11-22 RX ADMIN — ACETAMINOPHEN 975 MG: 325 TABLET ORAL at 06:56

## 2023-11-22 RX ADMIN — MAGNESIUM SULFATE HEPTAHYDRATE 4 G: 40 INJECTION, SOLUTION INTRAVENOUS at 10:30

## 2023-11-22 RX ADMIN — MAGNESIUM SULFATE HEPTAHYDRATE 2 G/HR: 40 INJECTION, SOLUTION INTRAVENOUS at 10:55

## 2023-11-22 RX ADMIN — OXYCODONE HYDROCHLORIDE 5 MG: 5 TABLET ORAL at 12:29

## 2023-11-22 RX ADMIN — ENOXAPARIN SODIUM 40 MG: 40 INJECTION SUBCUTANEOUS at 21:39

## 2023-11-22 RX ADMIN — SUCCINYLCHOLINE CHLORIDE 140 MG: 20 INJECTION, SOLUTION INTRAMUSCULAR; INTRAVENOUS; PARENTERAL at 07:44

## 2023-11-22 RX ADMIN — OXYCODONE HYDROCHLORIDE 5 MG: 5 TABLET ORAL at 20:25

## 2023-11-22 RX ADMIN — KETOROLAC TROMETHAMINE 30 MG: 30 INJECTION, SOLUTION INTRAMUSCULAR; INTRAVENOUS at 16:58

## 2023-11-22 RX ADMIN — ACETAMINOPHEN 975 MG: 325 TABLET, FILM COATED ORAL at 20:24

## 2023-11-22 ASSESSMENT — ACTIVITIES OF DAILY LIVING (ADL)
ADLS_ACUITY_SCORE: 18
ADLS_ACUITY_SCORE: 22
ADLS_ACUITY_SCORE: 18
ADLS_ACUITY_SCORE: 22
ADLS_ACUITY_SCORE: 18
ADLS_ACUITY_SCORE: 18
ADLS_ACUITY_SCORE: 22
ADLS_ACUITY_SCORE: 18
ADLS_ACUITY_SCORE: 18

## 2023-11-22 NOTE — OP NOTE
[facility]   Full Operative Progress Note     Surgery Date:  2023  Surgeon:  Nidhi Stacy MD   Assistants:  Valerie Hamilton,  scrub   Pre-op Diagnosis:  1. Intrauterine pregnancy at 37w0d     2. Chronic hypertension  3. Variable presentation   Post-op Diagnosis:  1. Same      2. Liveborn male infant   Procedure:  Primary low-transverse  section with two layer uterine closure via Pfannenstiel incision    Anesthesia: GETA  EBL:  350 mL    Drains: Wesley Catheter   Specimens:  Cord blood, placenta   Complications: None   Indications:   Melissa L Scheierl is a 33 year old  at 37w0d admitted for scheduled primary  section for chronic hypertension and variable presentation. On BSUS today, presentation was transverse then breech.  The risks, benefits, and alternatives of  section were discussed with the patient, and she agreed to proceed. Written consent was signed.     Findings:     Liveborn male infant in transverse, internally verted to complete breech presentation for delivery. Apgars 7 at 1 minute & 9 at 5 minutes. Weight 3130g.  Clear amniotic fluid   Normal appearing uterus, fallopian tubes, and ovaries.   No intra-abdominal adhesions     Procedure Details:   The patient was brought to the OR. Spinal anesthesia was attempted but was not able to be placed. The decision was made to proceed with planned general anesthesia. She was placed in the dorsal supine position and a Wesley catheter was placed. The pannus was retracted, and she was then prepped and draped in the usual sterile fashion.    She received 3g Ancef for perioperative prophylaxis. A surgical time out was performed confirming the patient and planned procedure. General anesthesia was then induced and she was intubated. A pfannenstiel skin incision was made with the scalpel, and carried down to the underlying fascia with sharp and blunt dissection. The fascia was incised in the midline, and the incision  was extended bilaterally in an upward curvilinear fashion. The fascia was dissected off of the underlying rectus muscles with blunt and sharp dissection, both superiorly and inferiorly. The rectus muscles were  in the midline, and the peritoneum was entered bluntly, and the opening was extended with digital pressure. The bladder blade was placed. A transverse hysterotomy was made with the scalpel in the lower uterine segment, and the incision was bluntly extended cephalocaudad with digital pressure. The infant was noted to be in the transverse position but was able to be verted internally to breech prior to AROM, and was delivered atraumatically using standard breech maneuvers.  No nuchal cord was noted. The cord was doubly clamped and cut immediately due to GETA and the infant was handed off to the awaiting NICU staff. A segment of cord was obtained for cord gases and routine cord blood obtained. The placenta was delivered with gentle traction on the umbilical cord and uterine massage. The uterus was exteriorized and cleared of all clots and debris. Uterine tone was noted to be firm with pitocin given through the running IV and uterine massage.   She also received prophylactic TXA at this time.  The hysterotomy was closed with a running locked suture of 0 Vicryl.  An Torres retractor was then placed for improved visualization and the uterus replaced. The hysterotomy was then imbricated using an 0 Monocryl suture. The hysterotomy was noted to be hemostatic. The pericolic gutters were cleared of all clots and debris. The hysterotomy was reexamined and noted to be hemostatic. The peritoneum was closed with a running 3-0 Vicryl suture. The rectus muscles were attempted to be re-approximated but there was too much tension on the pre-existing diastasis and the suture was beginning to tear through the muscle so only one suture was placed inferiorly.  The fascia and rectus muscles were examined and made hemostatic  using electrocautery. No active bleeding was noted but Ann was placed over the rectus muscles. The fascia was closed with a running 0 Vicryl suture. The subcutaneous tissue was irrigated and areas of oozing were controlled with electrocautery. The subcutaneous tissue was re-approximated with 3-0 plain gut in a running suture. The skin was closed with 4-0 Monocryl and covered with a sterile Aquacell dressing.    All sponge, lap, needle, and instrument counts were correct x2. The patient tolerated the procedure well, and was transferred to PACU in stable condition.    Nidhi Stacy MD  Obstetrics, Gynecology and Infertility   11/22/2023, 9:03 AM

## 2023-11-22 NOTE — PROVIDER NOTIFICATION
11/22/23 1000   Provider Notification   Provider Name/Title Dr Stacy   Method of Notification Phone   Request Evaluate - Remote   Notification Reason Status Update;Maternal Vital Sign Change     MD updated on elevated BPs in the Main PACU. MD would like to start a 4g Mag bolus and a continuous infusion of 2g. Hypertension order set and labetalol algorithm orders given via telephone with read back.     Pt transferred to room 432 from Main PACU for the rest of recovery. Report given to Maite DE LA PAZ. Mag infusion started. Reflexes and conus checked.

## 2023-11-22 NOTE — ANESTHESIA POSTPROCEDURE EVALUATION
Patient: Melissa L Scheierl    Procedure: Procedure(s):  Primary  section       Anesthesia Type:  General    Note:  Disposition: Inpatient   Postop Pain Control: Uneventful            Sign Out: Well controlled pain   PONV: No   Neuro/Psych: Uneventful            Sign Out: Acceptable/Baseline neuro status   Airway/Respiratory: Uneventful            Sign Out: Acceptable/Baseline resp. status   CV/Hemodynamics: Uneventful            Sign Out: Acceptable CV status   Other NRE: NONE   DID A NON-ROUTINE EVENT OCCUR? No           Last vitals:  Vitals Value Taken Time   /69 23 1020   Temp 36.2  C (97.2  F) 23 1020   Pulse 86 23 1007   Resp 16 23 1020   SpO2 94 % 23 1020   Vitals shown include unfiled device data.    Electronically Signed By: Yvon Macario MD  2023  2:52 PM

## 2023-11-22 NOTE — ANESTHESIA PROCEDURE NOTES
Airway       Patient location: OB.       Procedure Start/Stop Times: 11/22/2023 7:46 AM  Staff -        Performed By: CRNA  Consent for Airway        Urgency: elective  Indications and Patient Condition       Indications for airway management: airway protection       Induction type:RSI       Mask difficulty assessment: 1 - vent by mask    Final Airway Details       Final airway type: endotracheal airway       Successful airway: ETT - single and Oral  Endotracheal Airway Details        ETT size (mm): 6.5       Cuffed: yes       Successful intubation technique: video laryngoscopy       VL Blade Size: Glidescope 3       Grade View of Cords: 1       Adjucts: stylet       Position: Right       Measured from: lips       Secured at (cm): 22       Bite block used: None    Post intubation assessment        Placement verified by: capnometry, equal breath sounds and chest rise        Number of attempts at approach: 1       Number of other approaches attempted: 0       Secured with: commercial tube garcia and tape       Ease of procedure: easy       Dentition: Intact and Unchanged    Medication(s) Administered   Medication Administration Time: 11/22/2023 7:46 AM

## 2023-11-22 NOTE — PLAN OF CARE
Liveborn male infant at 0752. See del summary and MD note for details. Pt under general anesthesia due to an unsuccessful spinal attempt. Plan to recover in Main PACU for 1 hr and then to move to PP for the rest of recovery. Pt and infant doing well.

## 2023-11-22 NOTE — PROGRESS NOTES
Asked by Dr. Stacy to attend the delivery of this term, male infant with a gestational age of 37 0/7 weeks secondary to need for general anesthesia and transverse positioning.      Infant with brief cry upon delivery, low tone and irregular respirations. He was brought to warmer at 45 seconds of age, where he was dried and stimulated. He then cried more vigorously and had spontaneous respirations. HR >120. Infant remained dusky so pulse oximeter was applied and saturations 50%. Blow by O2 initiated at 3 minutes of life and administered for 1 minute. Saturations improved to 90%. Infant maintained saturations >85% for 4 minutes off oxygen. Breath sounds clear and equal with good air entry throughout.  Gross PE is WNL.  Infant required no further resuscitation.  Apgars 7 and 9 at 1 and 5 minutes respectively. Infant was left in the care of L&D staff for routine  cares.    FOSTER Nieves CNP on 2023 at 8:11 AM

## 2023-11-22 NOTE — ANESTHESIA PREPROCEDURE EVALUATION
"Anesthesia Pre-Procedure Evaluation    Patient: Melissa L Scheierl   MRN: [id number] : 1990        Procedure : Procedure(s):  Primary  section          Past Medical History:   Diagnosis Date    Hypertension       Past Surgical History:   Procedure Laterality Date    LAPAROSCOPIC CHOLECYSTECTOMY        No Known Allergies   Social History     Tobacco Use    Smoking status: Never     Passive exposure: Never    Smokeless tobacco: Never   Substance Use Topics    Alcohol use: Not Currently      Wt Readings from Last 1 Encounters:   23 142.9 kg (315 lb)        Anesthesia Evaluation            ROS/MED HX  ENT/Pulmonary:  - neg pulmonary ROS     Neurologic:  - neg neurologic ROS     Cardiovascular:     (+)  hypertension- -   -  - -                                      METS/Exercise Tolerance: >4 METS    Hematologic:  - neg hematologic  ROS     Musculoskeletal:       GI/Hepatic:       Renal/Genitourinary:       Endo:     (+)               Obesity,       Psychiatric/Substance Use:       Infectious Disease:       Malignancy:       Other:            Physical Exam    Airway        Mallampati: II   TM distance: > 3 FB   Neck ROM: full   Mouth opening: > 3 cm    Respiratory Devices and Support         Dental       (+) Completely normal teeth      Cardiovascular   cardiovascular exam normal          Pulmonary   pulmonary exam normal                OUTSIDE LABS:  CBC:   Lab Results   Component Value Date    WBC 11.6 (H) 2023    HGB 13.5 2023    HCT 41.5 2023     2023     BMP: No results found for: \"NA\", \"POTASSIUM\", \"CHLORIDE\", \"CO2\", \"BUN\", \"CR\", \"GLC\"  COAGS: No results found for: \"PTT\", \"INR\", \"FIBR\"  POC: No results found for: \"BGM\", \"HCG\", \"HCGS\"  HEPATIC: No results found for: \"ALBUMIN\", \"PROTTOTAL\", \"ALT\", \"AST\", \"GGT\", \"ALKPHOS\", \"BILITOTAL\", \"BILIDIRECT\", \"DANA\"  OTHER: No results found for: \"PH\", \"LACT\", \"A1C\", \"LAURA\", \"PHOS\", \"MAG\", \"LIPASE\", \"AMYLASE\", \"TSH\", \"T4\", " "\"T3\", \"CRP\", \"SED\"    Anesthesia Plan    ASA Status:  2    NPO Status:  NPO Appropriate    Anesthesia Type: Spinal.         Techniques and Equipment:       - Blood: T&S     Consents    Anesthesia Plan(s) and associated risks, benefits, and realistic alternatives discussed. Questions answered and patient/representative(s) expressed understanding.     - Discussed:     - Discussed with:  Patient            Postoperative Care    Pain management: IV analgesics, intrathecal morphine, Multi-modal analgesia.   PONV prophylaxis: Ondansetron (or other 5HT-3)     Comments:                Yvon Macario MD  "

## 2023-11-22 NOTE — L&D DELIVERY NOTE
Asked by Dr. Stacy to attend the delivery of this term, male infant with a gestational age of 37 0/7 weeks secondary to need for general anesthesia and transverse positioning.      Infant with brief cry upon delivery, low tone and irregular respirations. He was brought to warmer at 45 seconds of age, where he was dried and stimulated. He then cried more vigorously and had spontaneous respirations. HR >120. Infant remained dusky so pulse oximeter was applied and saturations 50%. Blow by O2 initiated at 3 minutes of life and administered for 1 minute. Saturations improved to 90%. Infant maintained saturations >85% for 4 minutes off oxygen. Breath sounds clear and equal with good air entry throughout.  Gross PE is WNL.  Infant required no further resuscitation.  Apgars 7 and 9 at 1 and 5 minutes respectively. Infant was left in the care of L&D staff for routine  cares.    FOSTER Nieves CNP on 2023 at 8:13 AM

## 2023-11-22 NOTE — DISCHARGE SUMMARY
[facility]   Discharge Summary    Melissa L Scheierl MRN# [id number]   Age: 33 year old YOB: 1990     Date of Admission:  2023  Date of Discharge:  2023 12:18 PM  Admitting Physician:  Nidhi Stacy MD  Discharge Physician:  Valerie Holden MD     Admit Dx:   - Intrauterine pregnancy at 37w0d  - Chronic hypertension on medication  - Variable presentation    Discharge Dx:  - Same as above, s/p primary low transverse  section  - Superimposed preeclampsia with severe features     Procedures:  - Primary low transverse  section with two layer uterine closure via Pfannenstiel incision  - General analgesia      Admit HPI:  Melissa L Scheierl is a 33 year old  at 37w0d admitted for scheduled primary  section for chronic hypertension and variable presentation. On BSUS today, presentation was transverse then breech.  The risks, benefits, and alternatives of  section were discussed with the patient, and she agreed to proceed. Written consent was signed.      Please see her admit H&P for full details of her PMH, PSH, Meds, Allergies and exam on admit.    Operative Course:  Surgery was uncomplicated. EBL from the delivery was 350cc. Please see her  Section Operative Note for full details regarding her delivery.    Operative Findings:   1. Liveborn male infant in transverse, internally verted to complete breech presentation for delivery. Apgars 7 at 1 minute & 9 at 5 minutes. Weight 3130g.  2. Clear amniotic fluid   3. Normal appearing uterus, fallopian tubes, and ovaries.   4. No intra-abdominal adhesions     Postoperative Course:  Her postoperative course was uncomplicated. On POD#3, she was meeting all of her postpartum goals and deemed stable for discharge. She was voiding without difficulty, tolerating a regular diet without nausea and vomiting, her pain was well controlled on oral pain medicines and her lochia was appropriate.   Rh negative  - baby also negative.   BPs controlled on labetalol 200mg TID       Discharge Medications:     Review of your medicines      START taking      Dose / Directions   oxyCODONE 5 MG tablet  Commonly known as: ROXICODONE  Used for: S/P  section      Dose: 5 mg  Take 1 tablet (5 mg) by mouth every 4 hours as needed for severe pain  Quantity: 15 tablet  Refills: 0     triamcinolone 0.1 % external cream  Commonly known as: KENALOG  Used for: Rash      Apply topically 3 times daily as needed (Rash)  Quantity: 60 g  Refills: 0        CONTINUE these medicines which may have CHANGED, or have new prescriptions. If we are uncertain of the size of tablets/capsules you have at home, strength may be listed as something that might have changed.      Dose / Directions   labetalol 200 MG tablet  Commonly known as: NORMODYNE  Indication: High Blood Pressure Disorder  This may have changed: when to take this  Used for: Chronic hypertension affecting pregnancy      Dose: 200 mg  Take 1 tablet (200 mg) by mouth 3 times daily  Quantity: 90 tablet  Refills: 3        CONTINUE these medicines which have NOT CHANGED      Dose / Directions   aspirin 81 MG chewable tablet  Commonly known as: ASA      Dose: 81 mg  Take 81 mg by mouth daily  Refills: 0     PNV Prenatal Plus Multivitamin 27-1 MG Tabs per tablet      Dose: 1 tablet  Take 1 tablet by mouth daily  Refills: 0     polyethylene glycol 17 g packet  Commonly known as: MIRALAX      Dose: 1 packet  Take 1 packet by mouth daily  Refills: 0           Where to get your medicines      Some of these will need a paper prescription and others can be bought over the counter. Ask your nurse if you have questions.    Bring a paper prescription for each of these medications    labetalol 200 MG tablet    oxyCODONE 5 MG tablet    triamcinolone 0.1 % external cream           Discharge/Disposition:  Melissa L Scheierl was discharged to home in stable condition with the following  instructions/medications:  1) Call for temperature > 100.4, bright red vaginal bleeding >1 pad an hour x 2 hours, foul smelling vaginal discharge, pain not controlled by usual oral pain meds, persistent nausea and vomiting not controlled on medications, drainage or redness from incision site    5) Discharge activity:  No heavy lifting >15 lbs or strenuous activity for 6 weeks, pelvic rest for 6 weeks, no driving or operating machinery while on narcotics.    Nidhi Stacy MD  Obstetrics, Gynecology and Infertility

## 2023-11-22 NOTE — PROVIDER NOTIFICATION
11/22/23 1105   Provider Notification   Provider Name/Title Dr Stacy   Method of Notification Phone   Request Evaluate-Remote   Notification Reason Medication Request     MD called to verify Toradol can be given before 6 hrs post op. MD okay with giving Toradol ASAP. Order for 0.5 of Dilaudid IV Q2 hrs given. Orders telephone with read back.

## 2023-11-22 NOTE — PROGRESS NOTES
Data: Patient admitted to room Mac 3 at 0505. Patient is a . Prenatal record reviewed.   OB History    Para Term  AB Living   1 0 0 0 0 0   SAB IAB Ectopic Multiple Live Births   0 0 0 0 0      # Outcome Date GA Lbr Frederic/2nd Weight Sex Delivery Anes PTL Lv   1 Current            .  Medical History:   Past Medical History:   Diagnosis Date    Hypertension    .  Gestational age 37w0d. Vital signs per doc flowsheet. Fetal movement present. Patient reports Scheduled  Section (Denies LOF or bleeding. Baby moving as normal.)   as reason for admission. Support persons Nick present.  Action: Verbal consent for EFM, external fetal monitors applied. Admission assessment completed. Patient and support persons educated on labor process. Patient instructed to report change in fetal movement, contractions, vaginal leaking of fluid or bleeding, abdominal pain, or any concerns related to the pregnancy to her nurse/physician. Patient oriented to room, call light in reach.   Response: Dr. Stacy informed of admission. Plan per provider is  section. Orders obtained and entered as read back. TORB. Patient verbalized understanding of education and verbalized agreement with plan.

## 2023-11-22 NOTE — PLAN OF CARE
Goal Outcome Evaluation:      Plan of Care Reviewed With: patient, spouse    Overall Patient Progress: improvingOverall Patient Progress: improving     Avss,  Pain well controlled.  Mother has been able to ambulate with steady gait and jon-care done.  Able to eat and tolerating food well.  Attempting to breastfeed, baby has not had a good feed since delivery at the breast will have mother begin pumping.

## 2023-11-22 NOTE — PROVIDER NOTIFICATION
23 0627   Provider Notification   Provider Name/Title Dr. Stacy   Method of Notification Phone   Request Evaluate - Remote   Notification Reason Patient Arrived;Other (Comment)  (orders)     Orders obtained for planned  section. Physician on way to hospital now.

## 2023-11-22 NOTE — ANESTHESIA PROCEDURE NOTES
"Intrathecal Procedure Note    Pre-Procedure   Staff -        Anesthesiologist:  Yvon Macario MD       Performed By: anesthesiologist       Location: OR       Pre-Anesthestic Checklist: patient identified, IV checked, risks and benefits discussed, informed consent, monitors and equipment checked, pre-op evaluation and at physician/surgeon's request  Timeout:       Correct Patient: Yes        Correct Procedure: Yes        Correct Site: Yes        Correct Position: Yes   Procedure Documentation  Procedure: intrathecal       Patient Position: sitting       Patient Prep/Sterile Barriers: sterile gloves, mask, patient draped       Skin prep: Betadine       Insertion Site: L2-3. (midline approach).       Needle Gauge: 24.        Needle Length (Inches): 5        Spinal Needle Type: Chiqui-Dannie       Introducer used       Introducer: 20 G       # of attempts: 3 and  # of redirects:  2 (0)    Assessment/Narrative         Paresthesias: No.       CSF fluid: clear.       Opening pressure was cmH2O while  Sitting.       Comments:  Failed SAB, attempted 2 separate levels with 124mm GM, was not able to find predictable tissue planes, patient began to get uncomfortable and c/o \"SOB\", procedure aborted and GETA anesthesia performed     No intrathecal morphine given    Patient tolerated the procedure well and without complications other than failed attempt.  Patient laid flat immediately.      FOR [facility] ([facility]) ONLY:   Pain Team Contact information: please page the Pain Team Via [employer]. Search \"Pain\". During daytime hours, please page the attending first. At night please page the resident first.      "

## 2023-11-22 NOTE — ANESTHESIA CARE TRANSFER NOTE
Patient: Melissa L Scheierl    Procedure: Procedure(s):  Primary  section       Diagnosis: Chronic hypertension in obstetric context [O10.919]  Unstable lie of fetus [O32.0XX0]  Diagnosis Additional Information: No value filed.    Anesthesia Type:   Spinal     Note:    Oropharynx: oropharynx clear of all foreign objects and spontaneously breathing  Level of Consciousness: awake  Oxygen Supplementation: face mask  Level of Supplemental Oxygen (L/min / FiO2): 6  Independent Airway: airway patency satisfactory and stable  Dentition: dentition unchanged  Vital Signs Stable: post-procedure vital signs reviewed and stable  Report to RN Given: handoff report given  Patient transferred to: PACU    Handoff Report: Identifed the Patient, Identified the Reponsible Provider, Reviewed the pertinent medical history, Discussed the surgical course, Reviewed Intra-OP anesthesia mangement and issues during anesthesia, Set expectations for post-procedure period and Allowed opportunity for questions and acknowledgement of understanding      Vitals:  Vitals Value Taken Time   BP     Temp     Pulse     Resp     SpO2         Electronically Signed By: FOSTER Mchugh CRNA  2023  9:07 AM

## 2023-11-22 NOTE — INTERVAL H&P NOTE
I have reviewed the surgical (or preoperative) H&P that is linked to this encounter, and examined the patient. There are no significant changes    Clinical Conditions Present on Arrival:  Clinically Significant Risk Factors Present on Admission                 # Drug Induced Platelet Defect: home medication list includes an antiplatelet medication        Burow's Advancement Flap Text: The defect edges were debeveled with a #15 scalpel blade.  Given the location of the defect and the proximity to free margins a Burow's advancement flap was deemed most appropriate.  Using a sterile surgical marker, the appropriate advancement flap was drawn incorporating the defect and placing the expected incisions within the relaxed skin tension lines where possible.    The area thus outlined was incised deep to adipose tissue with a #15 scalpel blade.  The skin margins were undermined to an appropriate distance in all directions utilizing iris scissors.

## 2023-11-23 LAB
ABO/RH(D): NORMAL
ALT SERPL W P-5'-P-CCNC: 13 U/L (ref 0–50)
AST SERPL W P-5'-P-CCNC: 24 U/L (ref 0–45)
CREAT SERPL-MCNC: 0.62 MG/DL (ref 0.51–0.95)
EGFRCR SERPLBLD CKD-EPI 2021: >90 ML/MIN/1.73M2
ERYTHROCYTE [DISTWIDTH] IN BLOOD BY AUTOMATED COUNT: 14.1 % (ref 10–15)
FETAL BLEED SCREEN: NORMAL
HCT VFR BLD AUTO: 33.2 % (ref 35–47)
HGB BLD-MCNC: 10.7 G/DL (ref 11.7–15.7)
MCH RBC QN AUTO: 28.5 PG (ref 26.5–33)
MCHC RBC AUTO-ENTMCNC: 32.2 G/DL (ref 31.5–36.5)
MCV RBC AUTO: 89 FL (ref 78–100)
PLATELET # BLD AUTO: 233 10E3/UL (ref 150–450)
RBC # BLD AUTO: 3.75 10E6/UL (ref 3.8–5.2)
SPECIMEN EXPIRATION DATE: NORMAL
WBC # BLD AUTO: 12 10E3/UL (ref 4–11)

## 2023-11-23 PROCEDURE — 85461 HEMOGLOBIN FETAL: CPT | Performed by: STUDENT IN AN ORGANIZED HEALTH CARE EDUCATION/TRAINING PROGRAM

## 2023-11-23 PROCEDURE — 258N000003 HC RX IP 258 OP 636: Performed by: STUDENT IN AN ORGANIZED HEALTH CARE EDUCATION/TRAINING PROGRAM

## 2023-11-23 PROCEDURE — 36415 COLL VENOUS BLD VENIPUNCTURE: CPT | Performed by: STUDENT IN AN ORGANIZED HEALTH CARE EDUCATION/TRAINING PROGRAM

## 2023-11-23 PROCEDURE — 250N000013 HC RX MED GY IP 250 OP 250 PS 637: Performed by: OBSTETRICS & GYNECOLOGY

## 2023-11-23 PROCEDURE — 84450 TRANSFERASE (AST) (SGOT): CPT | Performed by: STUDENT IN AN ORGANIZED HEALTH CARE EDUCATION/TRAINING PROGRAM

## 2023-11-23 PROCEDURE — 85027 COMPLETE CBC AUTOMATED: CPT | Performed by: STUDENT IN AN ORGANIZED HEALTH CARE EDUCATION/TRAINING PROGRAM

## 2023-11-23 PROCEDURE — 84460 ALANINE AMINO (ALT) (SGPT): CPT | Performed by: STUDENT IN AN ORGANIZED HEALTH CARE EDUCATION/TRAINING PROGRAM

## 2023-11-23 PROCEDURE — 250N000011 HC RX IP 250 OP 636: Mod: JZ | Performed by: STUDENT IN AN ORGANIZED HEALTH CARE EDUCATION/TRAINING PROGRAM

## 2023-11-23 PROCEDURE — 120N000012 HC R&B POSTPARTUM

## 2023-11-23 PROCEDURE — 250N000013 HC RX MED GY IP 250 OP 250 PS 637: Performed by: STUDENT IN AN ORGANIZED HEALTH CARE EDUCATION/TRAINING PROGRAM

## 2023-11-23 PROCEDURE — 82565 ASSAY OF CREATININE: CPT | Performed by: STUDENT IN AN ORGANIZED HEALTH CARE EDUCATION/TRAINING PROGRAM

## 2023-11-23 RX ORDER — LABETALOL 200 MG/1
200 TABLET, FILM COATED ORAL EVERY 8 HOURS SCHEDULED
Status: DISCONTINUED | OUTPATIENT
Start: 2023-11-23 | End: 2023-11-25 | Stop reason: HOSPADM

## 2023-11-23 RX ADMIN — ACETAMINOPHEN 975 MG: 325 TABLET, FILM COATED ORAL at 20:07

## 2023-11-23 RX ADMIN — ACETAMINOPHEN 975 MG: 325 TABLET, FILM COATED ORAL at 01:45

## 2023-11-23 RX ADMIN — MAGNESIUM SULFATE HEPTAHYDRATE 2 G/HR: 40 INJECTION, SOLUTION INTRAVENOUS at 06:28

## 2023-11-23 RX ADMIN — ACETAMINOPHEN 975 MG: 325 TABLET, FILM COATED ORAL at 08:37

## 2023-11-23 RX ADMIN — OXYCODONE HYDROCHLORIDE 5 MG: 5 TABLET ORAL at 18:37

## 2023-11-23 RX ADMIN — OXYCODONE HYDROCHLORIDE 5 MG: 5 TABLET ORAL at 05:31

## 2023-11-23 RX ADMIN — IBUPROFEN 800 MG: 400 TABLET ORAL at 16:46

## 2023-11-23 RX ADMIN — ACETAMINOPHEN 975 MG: 325 TABLET, FILM COATED ORAL at 14:18

## 2023-11-23 RX ADMIN — IBUPROFEN 800 MG: 400 TABLET ORAL at 22:15

## 2023-11-23 RX ADMIN — ENOXAPARIN SODIUM 40 MG: 40 INJECTION SUBCUTANEOUS at 08:37

## 2023-11-23 RX ADMIN — LABETALOL HYDROCHLORIDE 200 MG: 200 TABLET, FILM COATED ORAL at 22:19

## 2023-11-23 RX ADMIN — IBUPROFEN 800 MG: 400 TABLET ORAL at 10:06

## 2023-11-23 RX ADMIN — LABETALOL HYDROCHLORIDE 200 MG: 200 TABLET, FILM COATED ORAL at 08:37

## 2023-11-23 RX ADMIN — OXYCODONE HYDROCHLORIDE 5 MG: 5 TABLET ORAL at 01:01

## 2023-11-23 RX ADMIN — OXYCODONE HYDROCHLORIDE 5 MG: 5 TABLET ORAL at 14:18

## 2023-11-23 RX ADMIN — HUMAN RHO(D) IMMUNE GLOBULIN 300 MCG: 1500 SOLUTION INTRAMUSCULAR; INTRAVENOUS at 18:38

## 2023-11-23 RX ADMIN — SIMETHICONE 80 MG: 80 TABLET, CHEWABLE ORAL at 08:37

## 2023-11-23 RX ADMIN — OXYCODONE HYDROCHLORIDE 5 MG: 5 TABLET ORAL at 22:20

## 2023-11-23 RX ADMIN — OXYCODONE HYDROCHLORIDE 5 MG: 5 TABLET ORAL at 10:06

## 2023-11-23 RX ADMIN — DOCUSATE SODIUM 50 MG AND SENNOSIDES 8.6 MG 2 TABLET: 8.6; 5 TABLET, FILM COATED ORAL at 08:37

## 2023-11-23 RX ADMIN — DOCUSATE SODIUM 50 MG AND SENNOSIDES 8.6 MG 2 TABLET: 8.6; 5 TABLET, FILM COATED ORAL at 20:07

## 2023-11-23 RX ADMIN — SODIUM CHLORIDE, POTASSIUM CHLORIDE, SODIUM LACTATE AND CALCIUM CHLORIDE 75 ML/HR: 600; 310; 30; 20 INJECTION, SOLUTION INTRAVENOUS at 01:01

## 2023-11-23 RX ADMIN — LABETALOL HYDROCHLORIDE 200 MG: 200 TABLET, FILM COATED ORAL at 14:18

## 2023-11-23 RX ADMIN — ENOXAPARIN SODIUM 40 MG: 40 INJECTION SUBCUTANEOUS at 22:25

## 2023-11-23 ASSESSMENT — ACTIVITIES OF DAILY LIVING (ADL)
ADLS_ACUITY_SCORE: 18

## 2023-11-23 NOTE — PROGRESS NOTES
"Postpartum Progress Note  Melissa L Scheierl  [id number]    Subjective:   Doing well this morning. Got a little rest last night. Moving around well, ambulating without dizziness. Pain well controlled with ibuprofen, tylenol and oxycodone. Voiding spontaneously. No flatus or BM yet. Baby doing well. Working on breastfeeding and pumping. Denies headaches, vision changes, nausea or vomiting.     Objective:  BP (!) 144/85   Pulse 102   Temp 98.6  F (37  C) (Oral)   Resp 20   Ht 1.765 m (5' 9.5\")   Wt 144.2 kg (317 lb 14.5 oz)   SpO2 96%   Breastfeeding Unknown   BMI 46.27 kg/m      I/O last 3 completed shifts:  In: 5711.25 [P.O.:1220; I.V.:4491.25]  Out: 3100 [Urine:2750; Blood:350]    General: NAD, sitting comfortably, alert  Heart: regular rate  Lungs: nonlabored breathing  Abdomen: soft, non distended, appropriately tender to palpation. Fundus firm below U.   Incision:   incision covered with dressing which is clean and dry   Extremities: warm and well perfused, 1+ edema in LE, nontender    Labs:  Component      Latest Ref Rn 2023  7:14 AM   WBC      4.0 - 11.0 10e3/uL 12.0 (H)    RBC Count      3.80 - 5.20 10e6/uL 3.75 (L)    Hemoglobin      11.7 - 15.7 g/dL 10.7 (L)    Hematocrit      35.0 - 47.0 % 33.2 (L)    MCV      78 - 100 fL 89    MCH      26.5 - 33.0 pg 28.5    MCHC      31.5 - 36.5 g/dL 32.2    RDW      10.0 - 15.0 % 14.1    Platelet Count      150 - 450 10e3/uL 233    Creatinine      0.51 - 0.95 mg/dL 0.62    GFR Estimate      >60 mL/min/1.73m2 >90    AST      0 - 45 U/L 24    ALT      0 - 50 U/L 13       Legend:  (H) High  (L) Low    Assessment/Plan:  Melissa Scheierl is a 33 year old  who is POD#1 s/p PLTCS for FGR and transverse presentation at 37w0d under general anesthesia.  Currently stable and doing well postpartum.   Intrapartum course complicated by sustained severe range BPs.     Chronic HTN with superimposed preE with severe features:  - S/p magnesium x 24 hours, stopped at " 10 AM  - Denies symptoms  - HELLP labs are stable  - On labetalol 200 mg BID, will increase to 200 mg TID. Continue to monitor and increase dose if needed    - Routine post-op cares  - Heme: Hgb 10.7, resume PNV with iron at discharge. ABLA expected, asymptomatic  - Pain well controlled  - General diet, continue bowel regimen  - Encourage ambulation  - Rh neg, baby also Rh neg  - Okay to shower  - Baby: doing well, BF and bottle feeding  - Dispo: d/c to home on POD#2-3 based on BP criteria and other postop goals, orders not yet done for DC    Matilda Mckeon MD  OB/GYN & Infertility  11/23/23

## 2023-11-23 NOTE — PLAN OF CARE
Goal Outcome Evaluation:    Vss, pain controlled with toradol, oxycodone and tylenol. Wesley draining adequate amounts. Pulled at 0615, will be due to void. Reflexes normal, no clonus. Denies headache, epigastric pain or vision changes.

## 2023-11-23 NOTE — LACTATION NOTE
"Lactation visit with Malissa, FOB, and baby boy.    Malissa was sitting in the rocking chair pumping at time of visit. Malissa is using a 24mm flange and it looks like the appropriate size. We reviewed pump settings on [facility] grade breast pump. Malissa shares that infant has been pretty sleepy with his breastfeeding attempts. Primary RN assisted earlier today with a nipple shield and had shared infant struggled to stay latched. Malissa has started pumping for stimulation and infant is being supplemented with formula via bottle. Suggested Malissa continue to call for assistance with breastfeeding so we can help with latching/positioning.     This RN had helped settle this patient yesterday when she arrived from her surgery. We had done initial breastfeeding education yesterday including:    Discussed  breastfeeding basics:   1. Watch for early feeding cues (licking lips, stirring or rooting, sucking movement with mouth, hands to mouth).  2. Infant should breastfeed on demand and a minimum of 8 times in 24 hours. Encourage/offer to breastfeed infant at least 3 hours (from the start of the last feeding).      Educated on techniques to wake a sleepy baby for feedings: un-swaddle infant, check infant's diaper, begin snuggling skin to skin and begin gentle stimulation including stroking infant's back and feet. Additionally, mom can hand express colostrum.     Reviewed breast feeding section in our \"Guide to Postpartum and Julian Care.\" Highlighting pages that educates to  feeding patterns/behavior: Emphasizing on day 1 infant may be more sleepy (the birthday nap); followed by a cluster-feeding (breastfeeding marathon) pattern on second day/night. We reviewed at the feeding log in back of booklet, how/why tracking infant's feedings and wet/dirty diapers is important.   Discussed physiology of milk production from colostrum through milk \"coming in\" between day 3-5 (typically). Also discussed when " Malissa would be likely to see volume with pumping.    Malissa has a new Spectra breast pump for home use.      Appreciative of visit.    Lilian Nguyen RN, IBCLC

## 2023-11-23 NOTE — PROVIDER NOTIFICATION
11/23/23 0800   Provider Notification   Provider Name/Title Dr Mckeon   Method of Notification Phone   Request Evaluate-Remote   Notification Reason Status Update     MD notified with Pt vitals ,status and Mag infusion order to discontinue Magnesium infusion by 10 am continue with plan for care

## 2023-11-24 PROCEDURE — 250N000011 HC RX IP 250 OP 636: Mod: JZ | Performed by: STUDENT IN AN ORGANIZED HEALTH CARE EDUCATION/TRAINING PROGRAM

## 2023-11-24 PROCEDURE — 250N000013 HC RX MED GY IP 250 OP 250 PS 637: Performed by: OBSTETRICS & GYNECOLOGY

## 2023-11-24 PROCEDURE — 250N000013 HC RX MED GY IP 250 OP 250 PS 637: Performed by: STUDENT IN AN ORGANIZED HEALTH CARE EDUCATION/TRAINING PROGRAM

## 2023-11-24 PROCEDURE — 120N000012 HC R&B POSTPARTUM

## 2023-11-24 RX ADMIN — IBUPROFEN 800 MG: 400 TABLET ORAL at 10:32

## 2023-11-24 RX ADMIN — DOCUSATE SODIUM 50 MG AND SENNOSIDES 8.6 MG 2 TABLET: 8.6; 5 TABLET, FILM COATED ORAL at 08:02

## 2023-11-24 RX ADMIN — ENOXAPARIN SODIUM 40 MG: 40 INJECTION SUBCUTANEOUS at 08:00

## 2023-11-24 RX ADMIN — ACETAMINOPHEN 975 MG: 325 TABLET, FILM COATED ORAL at 08:02

## 2023-11-24 RX ADMIN — ACETAMINOPHEN 975 MG: 325 TABLET, FILM COATED ORAL at 20:24

## 2023-11-24 RX ADMIN — IBUPROFEN 800 MG: 400 TABLET ORAL at 16:22

## 2023-11-24 RX ADMIN — IBUPROFEN 800 MG: 400 TABLET ORAL at 22:40

## 2023-11-24 RX ADMIN — LABETALOL HYDROCHLORIDE 200 MG: 200 TABLET, FILM COATED ORAL at 14:38

## 2023-11-24 RX ADMIN — LABETALOL HYDROCHLORIDE 200 MG: 200 TABLET, FILM COATED ORAL at 22:40

## 2023-11-24 RX ADMIN — ACETAMINOPHEN 975 MG: 325 TABLET, FILM COATED ORAL at 14:35

## 2023-11-24 RX ADMIN — OXYCODONE HYDROCHLORIDE 5 MG: 5 TABLET ORAL at 02:14

## 2023-11-24 RX ADMIN — DOCUSATE SODIUM 50 MG AND SENNOSIDES 8.6 MG 1 TABLET: 8.6; 5 TABLET, FILM COATED ORAL at 20:24

## 2023-11-24 RX ADMIN — OXYCODONE HYDROCHLORIDE 5 MG: 5 TABLET ORAL at 22:40

## 2023-11-24 RX ADMIN — LABETALOL HYDROCHLORIDE 200 MG: 200 TABLET, FILM COATED ORAL at 06:18

## 2023-11-24 RX ADMIN — OXYCODONE HYDROCHLORIDE 5 MG: 5 TABLET ORAL at 14:35

## 2023-11-24 RX ADMIN — IBUPROFEN 800 MG: 400 TABLET ORAL at 03:58

## 2023-11-24 RX ADMIN — ACETAMINOPHEN 975 MG: 325 TABLET, FILM COATED ORAL at 02:14

## 2023-11-24 RX ADMIN — OXYCODONE HYDROCHLORIDE 5 MG: 5 TABLET ORAL at 10:33

## 2023-11-24 RX ADMIN — OXYCODONE HYDROCHLORIDE 5 MG: 5 TABLET ORAL at 18:49

## 2023-11-24 RX ADMIN — OXYCODONE HYDROCHLORIDE 5 MG: 5 TABLET ORAL at 06:18

## 2023-11-24 ASSESSMENT — ACTIVITIES OF DAILY LIVING (ADL)
ADLS_ACUITY_SCORE: 18

## 2023-11-24 NOTE — PLAN OF CARE
Goal Outcome Evaluation:         Vss, pain controlled with oxycodone, ibuprofen and tylenol. Voiding adequate amounts without difficulty. Normal reflexes, no clonus. Denies headache, epigastric pain, vision changes.

## 2023-11-24 NOTE — PLAN OF CARE
Vital signs stable.BP remain 130/80's denies headache,blurred vision and epigastric pain +2 reflex  no clonus   Magnesium discontinued at 10 am On labetalol 200 mg TID Postpartum assessment WDL. Incision dressing on . Pain controlled with tylenol,motrin and oxycodone . Patient up ambulating voiding without difficulty  Patient passing gas minimal mylicon given  Working on Breastfeeding pumping and bottle feeding formula Baby A+/neg rhogam given  Patient and infant bonding well. Will continue with current plan of care.

## 2023-11-24 NOTE — PROGRESS NOTES
POD#2    S: Doing well. Pain controlled. Voiding well, small BM yesterday. Tolerating regular diet.    O: Patient Vitals for the past 12 hrs:   BP Temp Temp src Pulse Resp Weight   11/24/23 0751 138/77 98.1  F (36.7  C) Oral 98 16 --   11/24/23 0616 129/80 -- -- -- 18 145.2 kg (320 lb 1.6 oz)   11/24/23 0214 120/77 -- -- 97 18 --   11/23/23 2217 136/82 98.1  F (36.7  C) Oral 108 18 --   11/23/23 2007 131/83 -- -- 113 18 --     Gen - NAD  Abd - FF, NT  Inc - Dressing intact  Ext - NT    A/P: 34yo POD#2 s/p LTCS for FGR and transverse presentation  1. CHTN with superimposed pre-eclampsia - s/p Magnesium, asymptomatic. Labetalol increased to 200mg TID yesterday and BPs well controlled.  2. Rh neg, baby Rh neg - no Rhogham needed  3. Routine cares, enc ambulation  4. Likely discharge tomorrow if BPs remain well controlled, orders not done    Silvia Sánchez MD

## 2023-11-25 VITALS
DIASTOLIC BLOOD PRESSURE: 79 MMHG | RESPIRATION RATE: 18 BRPM | OXYGEN SATURATION: 96 % | WEIGHT: 293 LBS | BODY MASS INDEX: 41.95 KG/M2 | SYSTOLIC BLOOD PRESSURE: 137 MMHG | TEMPERATURE: 98.4 F | HEART RATE: 104 BPM | HEIGHT: 70 IN

## 2023-11-25 PROBLEM — O10.919 CHRONIC HYPERTENSION AFFECTING PREGNANCY: Status: ACTIVE | Noted: 2023-11-25

## 2023-11-25 LAB — PLATELET # BLD AUTO: 205 10E3/UL (ref 150–450)

## 2023-11-25 PROCEDURE — 250N000013 HC RX MED GY IP 250 OP 250 PS 637: Performed by: STUDENT IN AN ORGANIZED HEALTH CARE EDUCATION/TRAINING PROGRAM

## 2023-11-25 PROCEDURE — 85049 AUTOMATED PLATELET COUNT: CPT | Performed by: STUDENT IN AN ORGANIZED HEALTH CARE EDUCATION/TRAINING PROGRAM

## 2023-11-25 PROCEDURE — 36415 COLL VENOUS BLD VENIPUNCTURE: CPT | Performed by: STUDENT IN AN ORGANIZED HEALTH CARE EDUCATION/TRAINING PROGRAM

## 2023-11-25 PROCEDURE — 250N000013 HC RX MED GY IP 250 OP 250 PS 637: Performed by: OBSTETRICS & GYNECOLOGY

## 2023-11-25 RX ORDER — TRIAMCINOLONE ACETONIDE 1 MG/G
CREAM TOPICAL 3 TIMES DAILY PRN
Qty: 60 G | Refills: 0 | Status: SHIPPED | OUTPATIENT
Start: 2023-11-25

## 2023-11-25 RX ORDER — OXYCODONE HYDROCHLORIDE 5 MG/1
5 TABLET ORAL EVERY 4 HOURS PRN
Qty: 15 TABLET | Refills: 0 | Status: SHIPPED | OUTPATIENT
Start: 2023-11-25

## 2023-11-25 RX ORDER — LABETALOL 200 MG/1
200 TABLET, FILM COATED ORAL 3 TIMES DAILY
Qty: 90 TABLET | Refills: 3 | Status: SHIPPED | OUTPATIENT
Start: 2023-11-25

## 2023-11-25 RX ADMIN — OXYCODONE HYDROCHLORIDE 5 MG: 5 TABLET ORAL at 02:40

## 2023-11-25 RX ADMIN — ACETAMINOPHEN 975 MG: 325 TABLET, FILM COATED ORAL at 02:40

## 2023-11-25 RX ADMIN — OXYCODONE HYDROCHLORIDE 5 MG: 5 TABLET ORAL at 06:24

## 2023-11-25 RX ADMIN — DOCUSATE SODIUM 50 MG AND SENNOSIDES 8.6 MG 1 TABLET: 8.6; 5 TABLET, FILM COATED ORAL at 08:46

## 2023-11-25 RX ADMIN — OXYCODONE HYDROCHLORIDE 5 MG: 5 TABLET ORAL at 11:40

## 2023-11-25 RX ADMIN — IBUPROFEN 800 MG: 400 TABLET ORAL at 11:36

## 2023-11-25 RX ADMIN — IBUPROFEN 800 MG: 400 TABLET ORAL at 04:41

## 2023-11-25 RX ADMIN — LABETALOL HYDROCHLORIDE 200 MG: 200 TABLET, FILM COATED ORAL at 06:24

## 2023-11-25 RX ADMIN — ACETAMINOPHEN 975 MG: 325 TABLET, FILM COATED ORAL at 08:46

## 2023-11-25 ASSESSMENT — ACTIVITIES OF DAILY LIVING (ADL)
ADLS_ACUITY_SCORE: 18

## 2023-11-25 NOTE — PLAN OF CARE
D: VSS, assessments WDL. Taking Tylenol, Ibuprofen and Oxycodone for pain. Denies HA, epigastric pain, vision changes. Reflexes 2+, no clonus. Will go home on Labetalol. Already has a BP cuff. Aquacel drsg. in place. Pumping and bottle feeding EBM and Similac. Feeling ready to go home. Rash on abd. and left upper thigh. Dr. Holden aware and ordered Kenalog cream TID.  I: Pt. received complete discharge paperwork and home medications as filled by discharge pharmacy.  Pt. was given times of last dose for all discharge medications in writing on discharge medication sheets.  Discharge teaching included home medication, pain management, activity restrictions, postpartum cares, and signs and symptoms of infection.    A: Discharge outcomes on care plan met.  Mother states understanding and comfort with self care and follow up care.   P: Pt. Discharged.  Pt. was accompanied by  and left with personal belongings.  Home care sent.  Pt. to follow up with OB provider per discharge instructions.  Pt. had no further questions at the time of discharge and no unmet needs were identified.  Goal Outcome Evaluation:

## 2023-11-25 NOTE — PLAN OF CARE
Goal Outcome Evaluation:      Plan of Care Reviewed With: patient, spouse    Overall Patient Progress: improvingOverall Patient Progress: improving     Vital signs stable. Postpartum assessment WDL. Fundus is firm and midline with scant bleeding. Incision dressing intact, no drainage noted. Pain controlled with tylenol, ibuprofen and oxycodone. Patient refused her lovenox injection, she reports a rash that came up this afternoon around the previous injection sites. Upon assessment, there is slight redness around the injection sites. It is not widespread, there are no hives present. Patient reports that it is not severe, but enough for her to notice it. Patient refused taking her lovenox d/t the rash.   At 0434 when assessing the patient, it is noted that the rash is spread to her lower L stomach and upper L thigh. Patient wants to try different underwear to see if there is an improvement in the rash, patient is declining wanting any medication for it at this time.     Patient ambulating independently. Patient reports passing gas. Patient plans to pump and bottle. Patient is pumping after feeds. Infant is bottle feeding formula and expressed breast milk. Patient and infant bonding well. Will continue with current plan of care.

## 2023-11-25 NOTE — PLAN OF CARE
Goal Outcome Evaluation:    Vital signs stable. Postpartum assessment WDL. Incision CDI. Pain controlled with tylenol/ibuprofen/oxy. Patient ambulating independently. Patient reports passing gas. Patient pumping every 3 hours and bottle feeding formula Patient and infant bonding well. Will continue with current plan of care.

## 2023-11-25 NOTE — DISCHARGE INSTRUCTIONS
Postop  Birth Instructions    Activity     Do not lift more than 10 pounds for 6 weeks after surgery.  Ask family and friends for help when you need it.  No driving until you have stopped taking your pain medications (usually two weeks after surgery).  No heavy exercise or activity for 6 weeks.  Don't do anything that will put a strain on your surgery site.  Don't strain when using the toilet.  Your care team may prescribe a stool softener if you have problems with your bowel movements.     To care for your incision:     Keep the incision clean and dry.  Do not soak your incision in water. No swimming or hot tubs until it has fully healed. You may soak in the bathtub if the water level is below your incision.  Do not use peroxide, gel, cream, lotion, or ointment on your incision.  Adjust your clothes to avoid pressure on your surgery site (check the elastic in your underwear for example).     You may see a small amount of clear or pink drainage and this is normal.  Check with your health care provider:     If the drainage increases or has an odor.  If the incision reddens, you have swelling, or develop a rash.  If you have increased pain and the medicine we prescribed doesn't help.  If you have a fever above 100.4 F (38 C) with or without chills when placing thermometer under your tongue.   The area around your incision (surgery wound), will feel numb.  This is normal. The numbness should go away in less than a year.     Keep your hands clean:  Always wash your hands before touching your incision (surgery wound). This helps reduce your risk of infection. If your hands aren't dirty, you may use an alcohol hand-rub to clean your hands. Keep your nails clean and short.    Call your healthcare provider if you have any of these symptoms:     You soak a sanitary pad with blood within 1 hour, or you see blood clots larger than a golf ball.  Bleeding that lasts more than 6 weeks.  Vaginal discharge that smells  bad.  Severe pain, cramping or tenderness in your lower belly area.  A need to urinate more frequently (use the toilet more often), more urgently (use the toilet very quickly), or it burns when you urinate.  Nausea and vomiting.  Redness, swelling or pain around a vein in your leg.  Problems breastfeeding or a red or painful area on your breast.  Chest pain and cough or are gasping for air.  Problems with coping with sadness, anxiety or depression. If you have concerns about hurting yourself or the baby, call your provider immediately.    You have questions or concerns after you return home.              Warning Signs after Having a Baby    Keep this paper on your fridge or somewhere else where you can see it.    Call your provider if you have any of these symptoms up to 12 weeks after having your baby.    Thoughts of hurting yourself or your baby  Pain in your chest or trouble breathing  Severe headache not helped by pain medicine  Eyesight concerns (blurry vision, seeing spots or flashes of light, other changes to eyesight)  Fainting, shaking or other signs of a seizure    Call 9-1-1 if you feel that it is an emergency.     The symptoms below can happen to anyone after giving birth. They can be very serious. Call your provider if you have any of these warning signs.    My provider s phone number: _______________________    Losing too much blood (hemorrhage)    Call your provider if you soak through a pad in less than an hour or pass blood clots bigger than a golf ball. These may be signs that you are bleeding too much.    Blood clots in the legs or lungs    After you give birth, your body naturally clots its blood to help prevent blood loss. Sometimes this increased clotting can happen in other areas of the body, like the legs or lungs. This can block your blood flow and be very dangerous.     Call your provider if you:  Have a red, swollen spot on the back of your leg that is warm or painful when you touch it.    Are coughing up blood.     Infection    Call your provider if you have any of these symptoms:  Fever of 100.4 F (38 C) or higher.  Pain or redness around your stitches if you had an incision.   Any yellow, white, or green fluid coming from places where you had stitches or surgery.    Mood Problems (postpartum depression)    Many people feel sad or have mood changes after having a baby. But for some people, these mood swings are worse.     Call your provider right away if you feel so anxious or nervous that you can't care for yourself or your baby.    Preeclampsia (high blood pressure)    Even if you didn't have high blood pressure when you were pregnant, you are at risk for the high blood pressure disease called preeclampsia. This risk can last up to 12 weeks after giving birth.     Call your provider if you have:   Pain on your right side under your rib cage  Sudden swelling in the hands and face    Remember: You know your body. If something doesn't feel right, get medical help.     For informational purposes only. Not to replace the advice of your health care provider. Copyright 2020 [facility]. All rights reserved. Clinically reviewed by Heena Ruvalcaba, RNC-OB, MSN. [employer] 599611 - Rev 02/23.

## 2023-11-25 NOTE — PROGRESS NOTES
November 25, 2023  Caesarian Section Daily Progress NOTE - PostOp DAY# 3    SUBJECTIVE:  C/O itchy rash LLQ    Pain controlled? Yes  Tolerating a regular diet? Yes  Ambulating? Yes  Voiding without difficulty? Yes  Lochia? minimal      OBJECTIVE:  Vitals:    11/25/23 0434 11/25/23 0545 11/25/23 0622 11/25/23 0846   BP: 139/79  125/81 137/79   BP Location: Right arm  Right arm    Patient Position: Semi-Ambrose's  Semi-Ambrose's    Cuff Size: Adult Large  Adult Large    Pulse: 89  93 104   Resp: 18   18   Temp: 98.7  F (37.1  C)   98.4  F (36.9  C)   TempSrc: Oral   Oral   SpO2:       Weight:  145.1 kg (319 lb 14.2 oz)     Height:           Constitutional: Healthy, alert and in no distress.   Abdomen:       Uterine fundus is firm, non-tender and at the level of the umbilicus.  Raised erythematous rash LLQ with some spread to leg  Incision:          Aquacel dressing Clean, Dry and Intact      LABS:  Hemoglobin   Date Value Ref Range Status   11/23/2023 10.7 (L) 11.7 - 15.7 g/dL Final   11/21/2023 13.5 11.7 - 15.7 g/dL Final         ASSESSMENT:   Post Op Day: 3   Primary LTCS    Doing well.  Normal healing wound.  Pain well-controlled.  Rash on LLQ seems c/w allergic dermatitis  CHTN - BP controlled on labetalol 200 TID       PLAN:      Continue labetalol 200 TID and check BP's at home  Rx for steroid cream to start at home on rash  Follow-up next week for BP check and rash lay Holden MD

## 2023-11-27 LAB
PATH REPORT.COMMENTS IMP SPEC: NORMAL
PATH REPORT.COMMENTS IMP SPEC: NORMAL
PATH REPORT.FINAL DX SPEC: NORMAL
PATH REPORT.GROSS SPEC: NORMAL
PATH REPORT.MICROSCOPIC SPEC OTHER STN: NORMAL
PATH REPORT.RELEVANT HX SPEC: NORMAL
PHOTO IMAGE: NORMAL

## 2024-12-15 ENCOUNTER — HEALTH MAINTENANCE LETTER (OUTPATIENT)
Age: 34
End: 2024-12-15

## 2025-03-12 ENCOUNTER — MEDICAL CORRESPONDENCE (OUTPATIENT)
Dept: HEALTH INFORMATION MANAGEMENT | Facility: CLINIC | Age: 35
End: 2025-03-12
Payer: COMMERCIAL

## 2025-03-12 ENCOUNTER — TRANSFERRED RECORDS (OUTPATIENT)
Dept: HEALTH INFORMATION MANAGEMENT | Facility: CLINIC | Age: 35
End: 2025-03-12
Payer: COMMERCIAL

## 2025-03-13 ENCOUNTER — TELEPHONE (OUTPATIENT)
Dept: CARDIOLOGY | Facility: CLINIC | Age: 35
End: 2025-03-13
Payer: COMMERCIAL

## 2025-04-16 ENCOUNTER — OFFICE VISIT (OUTPATIENT)
Dept: CARDIOLOGY | Facility: CLINIC | Age: 35
End: 2025-04-16
Payer: COMMERCIAL

## 2025-04-16 ENCOUNTER — HOSPITAL ENCOUNTER (OUTPATIENT)
Dept: CARDIOLOGY | Facility: CLINIC | Age: 35
Discharge: HOME | End: 2025-04-16
Payer: COMMERCIAL

## 2025-04-16 DIAGNOSIS — O35.BXX0 FETAL CARDIAC DISEASE AFFECTING PREGNANCY, SINGLE OR UNSPECIFIED FETUS: Primary | ICD-10-CM

## 2025-04-16 DIAGNOSIS — O09.811 PREGNANCY RESULTING FROM IN VITRO FERTILIZATION IN FIRST TRIMESTER: ICD-10-CM

## 2025-04-16 PROCEDURE — 93325 DOPPLER ECHO COLOR FLOW MAPG: CPT

## 2025-04-16 PROCEDURE — 76825 ECHO EXAM OF FETAL HEART: CPT | Mod: 26 | Performed by: PEDIATRICS

## 2025-04-16 PROCEDURE — 76825 ECHO EXAM OF FETAL HEART: CPT

## 2025-04-16 PROCEDURE — 93325 DOPPLER ECHO COLOR FLOW MAPG: CPT | Mod: 26 | Performed by: PEDIATRICS

## 2025-04-16 PROCEDURE — 76827 ECHO EXAM OF FETAL HEART: CPT | Mod: 26 | Performed by: PEDIATRICS

## 2025-04-16 NOTE — PROGRESS NOTES
Fetal Cardiology Consultation    Patient:  Melissa L Scheierl MRN:  [id number]   YOB: 1990 Age:  35 year old   Date of Visit:  4/16/2025 PCP:  Nidhi Stacy MD   MIRNA: Not found. EGA: Unknown weeks     Dear Doctor,     I had the pleasure of seeing Melissa L Scheierl at the [facility] in [place] on 4/16/2025 in consultation for fetal echocardiography results. She presented today by herself. As you know, she is a 35 year old female with current pregnancy achieved through IVF.    The fetal echocardiogram was normal. Normal fetal cardiac anatomy. Normal right and left ventricular size and function without hypertrophy. No evidence of diastolic dysfunction. No pericardial effusion. No arrhythmia.     I reviewed and interpreted the fetal echocardiogram today. I discussed the normal results with Ms. Scheierl. While these results are normal, it is important to note that fetal echocardiography cannot exclude small atrial or ventricular septal defects, persistent ductus arteriosus, mild coarctation of the aorta, partial anomalous pulmonary venous return, minor anatomic valve anomalies, or coronary artery anomalies.     -- No additional fetal echocardiograms are recommended for this pregnancy.    Thank you for allowing me to participate in Ms. Scheierl's care. Please don't hesitate to contact me or the Fetal Cardiology team at [facility] with any questions or concerns.     This visit was separate from the performance and interpretation of the ultrasound. The majority of the time (>50%) was spent in counseling and coordination of care. I spent approximately 20 minutes in face-to-face time reviewing the above considerations.    Roberto Lino MD  Pediatric Cardiology  [facility]  Phone [phone]

## (undated) DEVICE — PREP CHLORAPREP 26ML TINTED HI-LITE ORANGE 930815

## (undated) DEVICE — DRSG TELFA 3X8" 1238

## (undated) DEVICE — ESU GROUND PAD UNIVERSAL W/O CORD

## (undated) DEVICE — PACK C-SECTION LF PL15OTA83B

## (undated) DEVICE — CATH TRAY FOLEY 16FR BARDEX W/DRAIN BAG STATLOCK 300316A

## (undated) DEVICE — BLADE CLIPPER 4406

## (undated) DEVICE — DECANTER BAG 2002S

## (undated) DEVICE — SOL NACL 0.9% IRRIG 1000ML BOTTLE 2F7124

## (undated) DEVICE — DRSG STERI STRIP 1/4X3" R1541

## (undated) DEVICE — DRSG GAUZE 4X4" TOPPER

## (undated) DEVICE — SU MONOCRYL 0 CT-1 36" UND Y946H

## (undated) DEVICE — SOL WATER IRRIG 1000ML BOTTLE 2F7114

## (undated) DEVICE — SU VICRYL 0 CT 36" J358H

## (undated) DEVICE — Device

## (undated) DEVICE — LINEN C-SECTION 5415

## (undated) DEVICE — SURGICEL POWDER ABSORBABLE HEMOSTAT 3GM 3013SP

## (undated) DEVICE — GLOVE ORTHO 7 LATEX

## (undated) DEVICE — TAPE MEDIPORE 4"X10YD 2964

## (undated) DEVICE — SU VICRYL 3-0 SH 27" J316H

## (undated) RX ORDER — PROPOFOL 10 MG/ML
INJECTION, EMULSION INTRAVENOUS
Status: DISPENSED
Start: 2023-11-22

## (undated) RX ORDER — FENTANYL CITRATE 50 UG/ML
INJECTION, SOLUTION INTRAMUSCULAR; INTRAVENOUS
Status: DISPENSED
Start: 2023-11-22

## (undated) RX ORDER — TRANEXAMIC ACID 10 MG/ML
INJECTION, SOLUTION INTRAVENOUS
Status: DISPENSED
Start: 2023-11-22

## (undated) RX ORDER — ONDANSETRON 2 MG/ML
INJECTION INTRAMUSCULAR; INTRAVENOUS
Status: DISPENSED
Start: 2023-11-22

## (undated) RX ORDER — HYDROMORPHONE HYDROCHLORIDE 1 MG/ML
INJECTION, SOLUTION INTRAMUSCULAR; INTRAVENOUS; SUBCUTANEOUS
Status: DISPENSED
Start: 2023-11-22

## (undated) RX ORDER — OXYTOCIN/0.9 % SODIUM CHLORIDE 30/500 ML
PLASTIC BAG, INJECTION (ML) INTRAVENOUS
Status: DISPENSED
Start: 2023-11-22

## (undated) RX ORDER — MORPHINE SULFATE 1 MG/ML
INJECTION, SOLUTION EPIDURAL; INTRATHECAL; INTRAVENOUS
Status: DISPENSED
Start: 2023-11-22